# Patient Record
Sex: FEMALE | Race: WHITE | NOT HISPANIC OR LATINO | Employment: OTHER | URBAN - METROPOLITAN AREA
[De-identification: names, ages, dates, MRNs, and addresses within clinical notes are randomized per-mention and may not be internally consistent; named-entity substitution may affect disease eponyms.]

---

## 2017-04-27 ENCOUNTER — TRANSCRIBE ORDERS (OUTPATIENT)
Dept: ADMINISTRATIVE | Facility: HOSPITAL | Age: 72
End: 2017-04-27

## 2017-04-27 DIAGNOSIS — R07.9 CHEST PAIN, UNSPECIFIED: Primary | ICD-10-CM

## 2017-05-01 ENCOUNTER — TRANSCRIBE ORDERS (OUTPATIENT)
Dept: ADMINISTRATIVE | Facility: HOSPITAL | Age: 72
End: 2017-05-01

## 2017-05-01 DIAGNOSIS — R07.9 CHEST PAIN, UNSPECIFIED: Primary | ICD-10-CM

## 2017-05-15 ENCOUNTER — TRANSCRIBE ORDERS (OUTPATIENT)
Dept: RADIOLOGY | Facility: HOSPITAL | Age: 72
End: 2017-05-15

## 2017-05-15 ENCOUNTER — HOSPITAL ENCOUNTER (OUTPATIENT)
Dept: RADIOLOGY | Facility: HOSPITAL | Age: 72
Discharge: HOME | End: 2017-05-15
Payer: COMMERCIAL

## 2017-05-15 DIAGNOSIS — M25.511 RIGHT SHOULDER PAIN, UNSPECIFIED CHRONICITY: Primary | ICD-10-CM

## 2017-05-15 DIAGNOSIS — M25.511 RIGHT SHOULDER PAIN, UNSPECIFIED CHRONICITY: ICD-10-CM

## 2017-05-15 PROCEDURE — 73030 X-RAY EXAM OF SHOULDER: CPT

## 2017-05-17 ENCOUNTER — HOSPITAL ENCOUNTER (OUTPATIENT)
Dept: NON INVASIVE DIAGNOSTICS | Facility: HOSPITAL | Age: 72
Discharge: HOME | End: 2017-05-17
Payer: COMMERCIAL

## 2017-05-17 DIAGNOSIS — R07.9 CHEST PAIN, UNSPECIFIED: ICD-10-CM

## 2017-05-17 LAB
ARRHY DURING EX: NORMAL
CHEST PAIN STATEMENT: NORMAL
MAX DIASTOLIC BP: 90 MMHG
MAX HEART RATE: 141 BPM
MAX PREDICTED HEART RATE: 149 BPM
MAX. SYSTOLIC BP: 160 MMHG
PROTOCOL NAME: NORMAL
REASON FOR TERMINATION: NORMAL
TARGET HR FORMULA: NORMAL
TEST INDICATION: NORMAL
TIME IN EXERCISE PHASE: 540 S

## 2017-05-17 PROCEDURE — 93350 STRESS TTE ONLY: CPT

## 2017-10-16 ENCOUNTER — GENERIC CONVERSION - ENCOUNTER (OUTPATIENT)
Dept: OTHER | Facility: OTHER | Age: 72
End: 2017-10-16

## 2017-10-16 DIAGNOSIS — Z12.31 ENCOUNTER FOR SCREENING MAMMOGRAM FOR MALIGNANT NEOPLASM OF BREAST: ICD-10-CM

## 2017-11-28 ENCOUNTER — HOSPITAL ENCOUNTER (OUTPATIENT)
Dept: RADIOLOGY | Facility: HOSPITAL | Age: 72
Discharge: HOME | End: 2017-11-28
Attending: OBSTETRICS & GYNECOLOGY
Payer: COMMERCIAL

## 2017-11-28 DIAGNOSIS — Z12.31 ENCOUNTER FOR SCREENING MAMMOGRAM FOR MALIGNANT NEOPLASM OF BREAST: ICD-10-CM

## 2017-11-28 PROCEDURE — G0202 SCR MAMMO BI INCL CAD: HCPCS

## 2017-12-05 ENCOUNTER — GENERIC CONVERSION - ENCOUNTER (OUTPATIENT)
Dept: OTHER | Facility: OTHER | Age: 72
End: 2017-12-05

## 2017-12-05 DIAGNOSIS — N95.1 FEMALE CLIMACTERIC STATE: ICD-10-CM

## 2017-12-05 PROCEDURE — G0145 SCR C/V CYTO,THINLAYER,RESCR: HCPCS | Performed by: OBSTETRICS & GYNECOLOGY

## 2017-12-06 ENCOUNTER — LAB REQUISITION (OUTPATIENT)
Dept: LAB | Facility: HOSPITAL | Age: 72
End: 2017-12-06
Payer: COMMERCIAL

## 2017-12-06 DIAGNOSIS — Z01.419 ENCOUNTER FOR GYNECOLOGICAL EXAMINATION WITHOUT ABNORMAL FINDING: ICD-10-CM

## 2017-12-15 LAB
LAB AP GYN PRIMARY INTERPRETATION: NORMAL
Lab: NORMAL

## 2017-12-18 ENCOUNTER — GENERIC CONVERSION - ENCOUNTER (OUTPATIENT)
Dept: OTHER | Facility: OTHER | Age: 72
End: 2017-12-18

## 2018-01-13 NOTE — RESULT NOTES
Verified Results  * MAMMO SCREENING BILATERAL W CAD [id number] 08:26AM Esmer Armendariz     Test Name Result Flag Reference   MAMMO SCREENING BILATERAL W CAD (Report)     Patient History:   Patient is postmenopausal    Family history of breast cancer in paternal aunt at age 61,    colorectal cancer in maternal grandfather at age 48 or over, and    breast cancer in maternal aunt at age 79  Benign WBUS breast guidance of the left breast, March 24, 2010  Excisional biopsy of the left breast    Took hormonal contraceptives for 3 months  Patient has never smoked  Patient's BMI is 23 2  Reason for exam: screening (asymptomatic)  Mammo Screening Bilateral W CAD: August 31, 2016 - Check In #:    [de-identified]   Bilateral MLO and CC view(s) were taken  Technologist: RT Rupesh(R)(M)   Prior study comparison: August 6, 2015, bilateral digital    screening mammogram performed at [address] [place] [address]     July 25, 2014, digital bilateral screening mammogram, performed    at [address] [place] [address]  July 11, 2013, digital bilateral   screening mammogram, performed at [address] [place] [address]  There are scattered fibroglandular densities  No new dominant    soft tissue mass, architectural distortion or suspicious    calcifications are noted  The skin and nipple structures are    within normal limits  Stable intramammary lymphnodes noted  Surgical scar marker present  No mammographic evidence of malignancy  No    significant changes when compared with prior studies  ASSESSMENT: BiRad:2 - Benign     Recommendation:   Routine screening mammogram of both breasts in 1 year  A    reminder letter will be scheduled  Analyzed by CAD     8-10% of cancers will be missed on mammography  Management of a    palpable abnormality must be based on clinical grounds  Patients   will be notified of their results via letter from our facility     Accredited by [employer] and FDA      Transcription Location: [facility]: [id number]     Risk Value(s):   Tyrer-Cuzick 10 Year: 11 135%, Tyrer-Cuzick Lifetime: 17 178%,    Myriad Table: 1 5%, NEIL 5 Year: 3 1%, NCI Lifetime: 8 9%   Signed by:   Aracely Moss MD   8/31/16

## 2018-01-15 NOTE — MISCELLANEOUS
Message   Recorded as Task   Date: 10/16/2017 11:00 AM, Created By: Sadaf Petersen   Task Name: Care Coordination   Assigned To: Be Renee   Regarding Patient: Joshua Melgar, Status: Active   CommentDayanaord Severin - 16 Oct 2017 11:00 AM     TASK CREATED  Caller: Self; Care Coordination; [phone] (Home)  Pt request mammo order  pt sees RS 11/21   Francisco Javier iMlls - 16 Oct 2017 11:19 AM     TASK EDITED  order in allscripts        Active Problems    1  Encounter for screening mammogram for malignant neoplasm of breast (V76 12)   (Z12 31)   2  Pap smear, as part of routine gynecological examination (V76 2) (Z01 419)   3  Visit for screening mammogram (V76 12) (Z12 31)    Current Meds   1  Calcium + D TABS; Therapy: (Delle Sox) to Recorded   2  Claritin CAPS; Therapy: (Recorded:[id number]) to Recorded    Allergies    1  No Known Drug Allergies    Plan  Encounter for screening mammogram for malignant neoplasm of breast    · * MAMMO SCREENING BILATERAL W CAD; Status:Hold For - Scheduling,Retrospective  By Protocol Authorization;  Requested PRL:[id number];     Signatures   Electronically signed by : Jeremy Aponte, ; Oct 16 2017 11:19AM EST                       (Author)

## 2018-01-17 NOTE — RESULT NOTES
Verified Results  * MAMMO SCREENING BILATERAL W CAD [id number] 03:11PM Opal Ho Order Number: [id number]    - Patient Instructions: To schedule this appointment, please contact Central Scheduling at [phone]  Do not wear any perfume, powder, lotion or deodorant on breast or underarm area  Please bring your doctors order, referral (if needed) and insurance information with you on the day of the test  Failure to bring this information may result in this test being rescheduled  Arrive 15 minutes prior to your appointment time to register  On the day of your test, please bring any prior mammogram or breast studies with you that were not performed at a [facility]  Failure to bring prior exams may result in your test needing to be rescheduled  Test Name Result Flag Reference   MAMMO SCREENING BILATERAL W CAD (Report)     Patient History:   Patient is postmenopausal    Family history of breast cancer at age 79 in maternal aunt,    breast cancer at age 61 in paternal aunt, colorectal cancer at    age 48 or over in maternal grandfather  Benign WBUS breast guidance of the left breast, March 24, 2010  Excisional biopsy of the left breast    Took hormonal contraceptives for 3 months  Patient has never smoked  Patient's BMI is 23 2  Reason for exam: screening, asymptomatic  Mammo Screening Bilateral W CAD: November 28, 2017 - Check In #:    [de-identified]   Bilateral CC and MLO view(s) were taken  Technologist: Danford Severin, RT(R)(M)   Prior study comparison: August 31, 2016, mammo screening    bilateral W CAD performed at [address] [place] [address]     August 6, 2015, bilateral digital screening mammogram performed    at [address] [place] [address]      The breast tissue is heterogeneously dense, potentially limiting    the sensitivity of mammography   Patient risk, included in this    report, assists in determining the appropriate screening regimen    (such as 3-D mammography or the inclusion of automated breast    ultrasound or MRI)  3-D mammography may also remain indicated as    screening  No dominant soft tissue mass, architectural distortion or    suspicious calcifications are noted in either breast   The skin    and nipple contours are within normal limits  No evidence of malignancy  No significant changes when compared with prior studies  ACR BI-RADSï¾® Assessments: BiRad:1 - Negative     Recommendation:   Routine screening mammogram of both breasts in 1 year  Analyzed by CAD     The patient is scheduled in a reminder system for screening    mammography  8-10% of cancers will be missed on mammography  Management of a    palpable abnormality must be based on clinical grounds  Patients   will be notified of their results via letter from our facility  Accredited by [employer] and FDA       Transcription Location: [facility] 98: [id number]     Risk Value(s):   Tyrer-Cuzick 10 Year: 5 700%, Tyrer-Cuzick Lifetime: 7 600%,    Myriad Table: 1 5%, NEIL 5 Year: 3 1%, NCI Lifetime: 8 1%   Signed by:   Ender Lainez MD   11/28/17

## 2018-01-23 NOTE — RESULT NOTES
Verified Results  (1) THIN PREP PAP WITH IMAGING [id number] 03:10PM [facility]     Test Name Result Flag Reference   LAB AP CASE REPORT (Report)     Gynecologic Cytology Report            Case: [id number]                  Authorizing Provider: Macey Canales MD     Collected:      12/05/2017           First Screen:     Tawnya Harada, CT   Received:      12/07/2017 1861        Rescreen:       WYATT Day                              Specimen:  LIQUID-BASED PAP, SCREENING, Cervix   LAB AP GYN PRIMARY INTERPRETATION      Negative for intraepithelial lesion or malignancy  Electronically signed by WYATT Day on 12/15/2017 at 3:10 PM   LAB AP GYN SPECIMEN ADEQUACY      Satisfactory for evaluation  (See note)   LAB AP GYN NOTE      No endocervical cells identified  It is difficult to differentiate between   squamous metaplastic cells and parabasal cells in atrophic specimens due   to numerous causes including menopause, postpartum changes and   progestational agents  LAB AP GYN ADDITIONAL INFORMATION (Report)     [employer]'s FDA approved ,  and ThinPrep Imaging System are   utilized with strict adherence to the 's instruction manual to   prepare gynecologic and non-gynecologic cytology specimens for the   production of ThinPrep slides as well as for gynecologic ThinPrep imaging  These processes have been validated by our laboratory and/or by the     The Pap test is not a diagnostic procedure and should not be used as the   sole means to detect cervical cancer  It is only a screening procedure to   aid in the detection of cervical cancer and its precursors  Both   false-negative and false-positive results have been experienced  Your   patient's test result should be interpreted in this context together with   the history and clinical findings     LAB AP LMP

## 2018-01-24 VITALS
SYSTOLIC BLOOD PRESSURE: 126 MMHG | HEIGHT: 62 IN | BODY MASS INDEX: 24.66 KG/M2 | WEIGHT: 134 LBS | DIASTOLIC BLOOD PRESSURE: 66 MMHG

## 2018-05-01 ENCOUNTER — TRANSCRIBE ORDERS (OUTPATIENT)
Dept: RADIOLOGY | Facility: CLINIC | Age: 73
End: 2018-05-01

## 2018-05-03 ENCOUNTER — HOSPITAL ENCOUNTER (OUTPATIENT)
Dept: RADIOLOGY | Age: 73
Discharge: HOME | End: 2018-05-03
Payer: COMMERCIAL

## 2018-05-03 DIAGNOSIS — N95.1 FEMALE CLIMACTERIC STATE: ICD-10-CM

## 2018-05-03 PROCEDURE — 77080 DXA BONE DENSITY AXIAL: CPT

## 2018-05-04 ENCOUNTER — TELEPHONE (OUTPATIENT)
Dept: OBGYN CLINIC | Facility: CLINIC | Age: 73
End: 2018-05-04

## 2018-05-04 NOTE — TELEPHONE ENCOUNTER
----- Message from Yue Sterling MD sent at 5/4/2018  9:23 AM EDT -----  Please call the patient regarding her DEXA scan  Her DEXA scan shows osteopenia  This is best treated with 1200 international units of vitamin D a day and 1200 mg of calcium a day plus exercise

## 2018-08-10 ENCOUNTER — APPOINTMENT (OUTPATIENT)
Dept: RADIOLOGY | Age: 73
End: 2018-08-10
Payer: COMMERCIAL

## 2018-08-10 ENCOUNTER — TRANSCRIBE ORDERS (OUTPATIENT)
Dept: ADMINISTRATIVE | Age: 73
End: 2018-08-10

## 2018-08-10 DIAGNOSIS — R05.9 COUGH: ICD-10-CM

## 2018-08-10 DIAGNOSIS — R05.9 COUGH: Primary | ICD-10-CM

## 2018-08-10 PROCEDURE — 71046 X-RAY EXAM CHEST 2 VIEWS: CPT

## 2018-08-13 ENCOUNTER — HOSPITAL ENCOUNTER (INPATIENT)
Facility: HOSPITAL | Age: 73
LOS: 2 days | Discharge: HOME | DRG: 871 | End: 2018-08-17
Attending: INTERNAL MEDICINE | Admitting: INTERNAL MEDICINE
Payer: COMMERCIAL

## 2018-08-13 ENCOUNTER — APPOINTMENT (EMERGENCY)
Dept: RADIOLOGY | Facility: HOSPITAL | Age: 73
DRG: 871 | End: 2018-08-13
Payer: COMMERCIAL

## 2018-08-13 DIAGNOSIS — J18.9 RIGHT LOWER LOBE PNEUMONIA: Primary | ICD-10-CM

## 2018-08-13 DIAGNOSIS — J18.9 COMMUNITY ACQUIRED PNEUMONIA, UNSPECIFIED LATERALITY: ICD-10-CM

## 2018-08-13 DIAGNOSIS — R06.82 TACHYPNEA: ICD-10-CM

## 2018-08-13 DIAGNOSIS — R09.02 HYPOXIA: ICD-10-CM

## 2018-08-13 DIAGNOSIS — D72.829 LEUKOCYTOSIS: ICD-10-CM

## 2018-08-13 PROBLEM — R74.01 TRANSAMINITIS: Status: ACTIVE | Noted: 2018-08-13

## 2018-08-13 PROBLEM — Z91.09 ENVIRONMENTAL ALLERGIES: Chronic | Status: ACTIVE | Noted: 2018-08-13

## 2018-08-13 PROBLEM — E87.20 LACTIC ACID ACIDOSIS: Status: ACTIVE | Noted: 2018-08-13

## 2018-08-13 PROBLEM — R73.9 HYPERGLYCEMIA: Status: ACTIVE | Noted: 2018-08-13

## 2018-08-13 PROBLEM — R06.02 SOB (SHORTNESS OF BREATH): Status: ACTIVE | Noted: 2018-08-13

## 2018-08-13 PROBLEM — J45.909 MILD ASTHMA: Chronic | Status: ACTIVE | Noted: 2018-08-13

## 2018-08-13 PROBLEM — R74.8 ALKALINE PHOSPHATASE ELEVATION: Status: ACTIVE | Noted: 2018-08-13

## 2018-08-13 PROBLEM — E87.2 LACTIC ACID ACIDOSIS: Status: ACTIVE | Noted: 2018-08-13

## 2018-08-13 LAB
ALBUMIN SERPL BCP-MCNC: 2.6 G/DL (ref 3.5–5)
ALP SERPL-CCNC: 199 U/L (ref 46–116)
ALT SERPL W P-5'-P-CCNC: 127 U/L (ref 12–78)
ANION GAP SERPL CALCULATED.3IONS-SCNC: 8 MMOL/L (ref 4–13)
AST SERPL W P-5'-P-CCNC: 92 U/L (ref 5–45)
ATRIAL RATE: 91 BPM
BASOPHILS # BLD MANUAL: 0 THOUSAND/UL (ref 0–0.1)
BASOPHILS NFR MAR MANUAL: 0 % (ref 0–1)
BILIRUB SERPL-MCNC: 0.51 MG/DL (ref 0.2–1)
BNP SERPL-MCNC: 80 PG/ML (ref 1–100)
BUN SERPL-MCNC: 16 MG/DL (ref 5–25)
CALCIUM SERPL-MCNC: 9.9 MG/DL (ref 8.3–10.1)
CHLORIDE SERPL-SCNC: 100 MMOL/L (ref 100–108)
CO2 SERPL-SCNC: 27 MMOL/L (ref 21–32)
CREAT SERPL-MCNC: 0.94 MG/DL (ref 0.6–1.3)
EOSINOPHIL # BLD MANUAL: 0 THOUSAND/UL (ref 0–0.4)
EOSINOPHIL NFR BLD MANUAL: 0 % (ref 0–6)
ERYTHROCYTE [DISTWIDTH] IN BLOOD BY AUTOMATED COUNT: 13.4 % (ref 11.6–15.1)
EST. AVERAGE GLUCOSE BLD GHB EST-MCNC: 140 MG/DL
GFR SERPL CREATININE-BSD FRML MDRD: 61 ML/MIN/1.73SQ M
GGT SERPL-CCNC: 191 U/L (ref 5–85)
GIANT PLATELETS BLD QL SMEAR: PRESENT
GLUCOSE SERPL-MCNC: 239 MG/DL (ref 65–140)
GLUCOSE SERPL-MCNC: 322 MG/DL (ref 65–140)
GLUCOSE SERPL-MCNC: 331 MG/DL (ref 65–140)
HBA1C MFR BLD: 6.5 % (ref 4.2–6.3)
HCT VFR BLD AUTO: 36 % (ref 34.8–46.1)
HGB BLD-MCNC: 11.7 G/DL (ref 11.5–15.4)
L PNEUMO1 AG UR QL IA.RAPID: NEGATIVE
LACTATE SERPL-SCNC: 2.2 MMOL/L (ref 0.5–2)
LACTATE SERPL-SCNC: 2.9 MMOL/L (ref 0.5–2)
LYMPHOCYTES # BLD AUTO: 0.23 THOUSAND/UL (ref 0.6–4.47)
LYMPHOCYTES # BLD AUTO: 1 % (ref 14–44)
MCH RBC QN AUTO: 30.2 PG (ref 26.8–34.3)
MCHC RBC AUTO-ENTMCNC: 32.5 G/DL (ref 31.4–37.4)
MCV RBC AUTO: 93 FL (ref 82–98)
METAMYELOCYTES NFR BLD MANUAL: 1 % (ref 0–1)
MONOCYTES # BLD AUTO: 0.93 THOUSAND/UL (ref 0–1.22)
MONOCYTES NFR BLD: 4 % (ref 4–12)
NEUTROPHILS # BLD MANUAL: 21.86 THOUSAND/UL (ref 1.85–7.62)
NEUTS BAND NFR BLD MANUAL: 11 % (ref 0–8)
NEUTS SEG NFR BLD AUTO: 83 % (ref 43–75)
NRBC BLD AUTO-RTO: 0 /100 WBCS
P AXIS: 73 DEGREES
PLATELET # BLD AUTO: 309 THOUSANDS/UL (ref 149–390)
PLATELET BLD QL SMEAR: ADEQUATE
PMV BLD AUTO: 11.5 FL (ref 8.9–12.7)
POTASSIUM SERPL-SCNC: 3.9 MMOL/L (ref 3.5–5.3)
PR INTERVAL: 126 MS
PROCALCITONIN SERPL-MCNC: 0.29 NG/ML
PROT SERPL-MCNC: 7.9 G/DL (ref 6.4–8.2)
QRS AXIS: -17 DEGREES
QRSD INTERVAL: 102 MS
QT INTERVAL: 342 MS
QTC INTERVAL: 420 MS
RBC # BLD AUTO: 3.87 MILLION/UL (ref 3.81–5.12)
S PNEUM AG UR QL: NEGATIVE
SODIUM SERPL-SCNC: 135 MMOL/L (ref 136–145)
T WAVE AXIS: 20 DEGREES
TROPONIN I SERPL-MCNC: <0.02 NG/ML
VENTRICULAR RATE: 91 BPM
WBC # BLD AUTO: 23.25 THOUSAND/UL (ref 4.31–10.16)

## 2018-08-13 PROCEDURE — 82948 REAGENT STRIP/BLOOD GLUCOSE: CPT

## 2018-08-13 PROCEDURE — 93005 ELECTROCARDIOGRAM TRACING: CPT

## 2018-08-13 PROCEDURE — 99285 EMERGENCY DEPT VISIT HI MDM: CPT

## 2018-08-13 PROCEDURE — 84484 ASSAY OF TROPONIN QUANT: CPT | Performed by: INTERNAL MEDICINE

## 2018-08-13 PROCEDURE — 99220 PR INITIAL OBSERVATION CARE/DAY 70 MINUTES: CPT | Performed by: INTERNAL MEDICINE

## 2018-08-13 PROCEDURE — 83880 ASSAY OF NATRIURETIC PEPTIDE: CPT | Performed by: EMERGENCY MEDICINE

## 2018-08-13 PROCEDURE — 83036 HEMOGLOBIN GLYCOSYLATED A1C: CPT | Performed by: INTERNAL MEDICINE

## 2018-08-13 PROCEDURE — 87449 NOS EACH ORGANISM AG IA: CPT | Performed by: EMERGENCY MEDICINE

## 2018-08-13 PROCEDURE — 93010 ELECTROCARDIOGRAM REPORT: CPT | Performed by: INTERNAL MEDICINE

## 2018-08-13 PROCEDURE — 36415 COLL VENOUS BLD VENIPUNCTURE: CPT | Performed by: EMERGENCY MEDICINE

## 2018-08-13 PROCEDURE — 87040 BLOOD CULTURE FOR BACTERIA: CPT | Performed by: EMERGENCY MEDICINE

## 2018-08-13 PROCEDURE — 85007 BL SMEAR W/DIFF WBC COUNT: CPT | Performed by: INTERNAL MEDICINE

## 2018-08-13 PROCEDURE — 82977 ASSAY OF GGT: CPT | Performed by: INTERNAL MEDICINE

## 2018-08-13 PROCEDURE — 80053 COMPREHEN METABOLIC PANEL: CPT | Performed by: INTERNAL MEDICINE

## 2018-08-13 PROCEDURE — 71046 X-RAY EXAM CHEST 2 VIEWS: CPT

## 2018-08-13 PROCEDURE — 83605 ASSAY OF LACTIC ACID: CPT | Performed by: INTERNAL MEDICINE

## 2018-08-13 PROCEDURE — 83605 ASSAY OF LACTIC ACID: CPT | Performed by: EMERGENCY MEDICINE

## 2018-08-13 PROCEDURE — 87070 CULTURE OTHR SPECIMN AEROBIC: CPT | Performed by: EMERGENCY MEDICINE

## 2018-08-13 PROCEDURE — 94640 AIRWAY INHALATION TREATMENT: CPT

## 2018-08-13 PROCEDURE — 85027 COMPLETE CBC AUTOMATED: CPT | Performed by: INTERNAL MEDICINE

## 2018-08-13 PROCEDURE — 84145 PROCALCITONIN (PCT): CPT | Performed by: EMERGENCY MEDICINE

## 2018-08-13 PROCEDURE — 87205 SMEAR GRAM STAIN: CPT | Performed by: EMERGENCY MEDICINE

## 2018-08-13 RX ORDER — ALBUTEROL SULFATE 2.5 MG/3ML
2.5 SOLUTION RESPIRATORY (INHALATION) EVERY 4 HOURS PRN
Status: DISCONTINUED | OUTPATIENT
Start: 2018-08-13 | End: 2018-08-14

## 2018-08-13 RX ORDER — SODIUM CHLORIDE 9 MG/ML
125 INJECTION, SOLUTION INTRAVENOUS CONTINUOUS
Status: DISCONTINUED | OUTPATIENT
Start: 2018-08-13 | End: 2018-08-14

## 2018-08-13 RX ORDER — ACETAMINOPHEN 325 MG/1
650 TABLET ORAL EVERY 6 HOURS PRN
Status: DISCONTINUED | OUTPATIENT
Start: 2018-08-13 | End: 2018-08-17 | Stop reason: HOSPADM

## 2018-08-13 RX ADMIN — IPRATROPIUM BROMIDE 0.5 MG: 0.5 SOLUTION RESPIRATORY (INHALATION) at 17:08

## 2018-08-13 RX ADMIN — INSULIN LISPRO 3 UNITS: 100 INJECTION, SOLUTION INTRAVENOUS; SUBCUTANEOUS at 23:06

## 2018-08-13 RX ADMIN — SODIUM CHLORIDE 125 ML/HR: 0.9 INJECTION, SOLUTION INTRAVENOUS at 20:12

## 2018-08-13 RX ADMIN — SODIUM CHLORIDE 500 ML: 0.9 INJECTION, SOLUTION INTRAVENOUS at 22:50

## 2018-08-13 RX ADMIN — SODIUM CHLORIDE 1000 ML: 0.9 INJECTION, SOLUTION INTRAVENOUS at 17:05

## 2018-08-13 RX ADMIN — ENOXAPARIN SODIUM 40 MG: 40 INJECTION SUBCUTANEOUS at 20:26

## 2018-08-13 RX ADMIN — ALBUTEROL SULFATE 5 MG: 2.5 SOLUTION RESPIRATORY (INHALATION) at 17:08

## 2018-08-13 RX ADMIN — CEFTRIAXONE SODIUM 1000 MG: 10 INJECTION, POWDER, FOR SOLUTION INTRAVENOUS at 17:05

## 2018-08-13 NOTE — ED ATTENDING ATTESTATION
I, Hilda Donald DO, saw and evaluated the patient  I have discussed the patient with the resident/non-physician practitioner and agree with the resident's/non-physician practitioner's findings, Plan of Care, and MDM as documented in the resident's/non-physician practitioner's note, except where noted  All available labs and Radiology studies were reviewed  At this point I agree with the current assessment done in the Emergency Department  I have conducted an independent evaluation of this patient a history and physical is as follows:      Critical Care Time  CritCare Time    Procedures     67 yr old fem to the ED with incr and fatigue and sob while being tx'd for bronchitis / pneumonia  Started on Zpak last Tues for bronchitis  CXR on Friday with possible RLL pneumonia  Started on pred on Friday  Finished Zpak on Saturday  Continued with incr worsening so sent to the ED  Exm; ox as low as 88% on RA  Tachypneic with rales in right base  Faint whz with cough  Afebrile  Appears exhausted  Better on the O2  EKG: new ST inversion in III  Pln: adm for outpt failure tx pneumonia with hypoxia and EKG changes

## 2018-08-13 NOTE — SEPSIS NOTE
Sepsis Note   Sandeep Fu 67 y o  female MRN: [id number]  Unit/Bed#: ED 29 Encounter: [id number]            Initial Sepsis Screening     Row Name 08/13/18 3890                Is the patient's history suggestive of a new or worsening infection? (!)  Yes (Proceed)  -HB        Suspected source of infection pneumonia  -HB        Are two or more of the following signs & symptoms of infection both present and new to the patient? (!)  Yes (Proceed)  -HB        Indicate SIRS criteria Leukocytosis (WBC > 79974 IJL); Tachycardia > 90 bpm;Tachypnea > 20 resp per min  -HB        If the answer is yes to both questions, suspicion of sepsis is present          If severe sepsis is present AND tissue hypoperfusion perists in the hour after fluid resuscitation or lactate > 4, the patient meets criteria for SEPTIC SHOCK          Are any of the following organ dysfunction criteria present within 6 hours of suspected infection and SIRS criteria that are NOT considered to be chronic conditions? (!)  Yes  -HB        Organ dysfunction          Date of presentation of severe sepsis          Time of presentation of severe sepsis          Tissue hypoperfusion persists in the hour after crystalloid fluid administration, evidenced, by either:          Was hypotension present within one hour of the conclusion of crystalloid fluid administration?           Date of presentation of septic shock          Time of presentation of septic shock            User Key  (r) = Recorded By, (t) = Taken By, (c) = Cosigned By    Initials Name Provider Type    DO Roberta Moncada

## 2018-08-13 NOTE — SEPSIS NOTE
Sepsis Note   Juliana Fonseca 67 y o  female MRN: [id number]  Unit/Bed#: ED 29 Encounter: [id number]            Initial Sepsis Screening     Row Name 08/13/18 1644                Is the patient's history suggestive of a new or worsening infection? (!)  Yes (Proceed)  -HB        Suspected source of infection pneumonia  -HB        Are two or more of the following signs & symptoms of infection both present and new to the patient? (!)  Yes (Proceed)  -HB        Indicate SIRS criteria Leukocytosis (WBC > 28780 IJL); Tachycardia > 90 bpm;Tachypnea > 20 resp per min  -HB        If the answer is yes to both questions, suspicion of sepsis is present          If severe sepsis is present AND tissue hypoperfusion perists in the hour after fluid resuscitation or lactate > 4, the patient meets criteria for SEPTIC SHOCK          Are any of the following organ dysfunction criteria present within 6 hours of suspected infection and SIRS criteria that are NOT considered to be chronic conditions? (!)  Yes  -HB        Organ dysfunction          Date of presentation of severe sepsis          Time of presentation of severe sepsis          Tissue hypoperfusion persists in the hour after crystalloid fluid administration, evidenced, by either:          Was hypotension present within one hour of the conclusion of crystalloid fluid administration?           Date of presentation of septic shock          Time of presentation of septic shock            User Key  (r) = Recorded By, (t) = Taken By, (c) = Cosigned By    Initials Name Provider Type    CHELSEA Moore DO Resident               Default Flowsheet Data (last 720 hours)      Sepsis Reassess     Row Name 08/13/18 9826                   Repeat Volume Status and Tissue Perfusion Assessment Performed    Repeat Volume Status and Tissue Perfusion Assessment Performed Yes  -HB           Volume Status and Tissue Perfusion Post Fluid Resuscitation- Must Document 5 of the Following 7: Vital Signs Reviewed (HR, RR, BP, T) Yes  -HB        Arterial Oxygen Saturation Reviewed (POx, SaO2 or SpO2) Yes (comment %)   92%  -HB        Cardio Normal S1/S2; Regular rate and rhythm; No murmor; No rub or gallop  -HB        Pulmonary (!)  Rales; Wheezes; Tachypnea  -HB        Capillary Refill Brisk  -HB        Peripheral Pulses Radial  -HB        Peripheral Pulse +2  -HB        Skin Warm;Dry  -HB        Urine output assessed Adequate  -HB           *OR*   Intensive Monitoring- Must Document One of the Following Four *:    Vital Signs Reviewed Yes  -HB        * Central Venous Pressure (CVP or RAP)          * Central Venous Oxygen (SVO2, ScvO2 or Oxygen saturation via central catheter)          * Bedside Cardiovascular US in IVC diameter and % collapse          * Passive Leg Raise OR Crystalloid Challenge            User Key  (r) = Recorded By, (t) = Taken By, (c) = Cosigned By    Initials Name Provider Type    CHELSEA Mascorro DO Resident

## 2018-08-13 NOTE — ED PROVIDER NOTES
History  Chief Complaint   Patient presents with    Shortness of Breath     Pt states "my allergist sent me in here because I have bronchitis and pneumonia " Pt c/o SOB with a cough with "thick yellow phlegm" and being "totally exhausted "     Patient is a 70-year-old female presenting to the emergency department for worsening of dyspnea, productive cough with yellow phlegm, and pleuritic chest pain  The patient states that last Tuesday she was diagnosed with acute bronchitis, was prescribed azithromycin, which she has completed her course of azithromycin this last Saturday  She returned to her physician, due to worsening symptoms on Saturday, chest x-ray revealed right lower lobe infiltrate  She was also prescribed oral prednisone  She was told to continue her antibiotics and steroids, and present to the emergency room should her symptoms worsen  She states she is here because she continues to have worsening of her dyspnea, her symptoms of dyspnea, cough, pleuritic chest pain have not resolved  She complains of subjective fever and chills, no recent hospitalizations, she lives at home  She has past medical history of asthma, and a p r n  albuterol inhaler which she has been utilizing her 4 hr without relief of her symptoms of dyspnea; She has never had to be hospitalized for her asthma, she has never had to be intubated for her asthma  The patient denies associated nausea, vomiting, abdominal pain  She does endorse diarrhea, without hematochezia or melena, which she states began shortly after she started her course of azithromycin  No prior history cardiac stenting or coronary artery disease, no history of DVT or pulmonary embolism  She states her chest pain is only associated with coughing, is right-sided and sharp in nature when she coughs  She denies hemoptysis  No history of formal therapy replacement    She denies back pain, neck pain or stiffness, dizziness, lightheadedness, does endorse fatigue which she states started around the same time as her cough and dyspnea  She had a sore throat when her symptoms began over a week ago, currently resolved  History provided by:  Patient   used: No    Shortness of Breath   Severity:  Unable to specify  Timing:  Constant  Progression:  Worsening  Chronicity:  New  Context: activity    Relieved by:  Nothing  Worsened by:  Nothing  Ineffective treatments:  None tried  Associated symptoms: chest pain, cough, diaphoresis, fever, sore throat and wheezing    Associated symptoms: no abdominal pain, no claudication, no ear pain, no headaches, no hemoptysis, no neck pain, no PND, no rash, no sputum production, no syncope, no swollen glands and no vomiting        None       Past Medical History:   Diagnosis Date    Allergic     environmental    Asthma     Pneumonia        Past Surgical History:   Procedure Laterality Date    BACK SURGERY      BREAST SURGERY      right biopsy    FOOT SURGERY      TONSILLECTOMY         Family History   Problem Relation Age of Onset    No Known Problems Mother     No Known Problems Father      I have reviewed and agree with the history as documented  Social History   Substance Use Topics    Smoking status: Never Smoker    Smokeless tobacco: Never Used    Alcohol use Yes      Comment: rarely        Review of Systems   Constitutional: Positive for activity change, diaphoresis, fatigue and fever  Negative for appetite change, chills and unexpected weight change  HENT: Positive for rhinorrhea, sinus pressure and sore throat  Negative for congestion, dental problem, drooling, ear discharge, ear pain, facial swelling, hearing loss, mouth sores, nosebleeds, postnasal drip, sinus pain, sneezing, tinnitus, trouble swallowing and voice change  Eyes: Negative  Respiratory: Positive for cough, shortness of breath and wheezing   Negative for apnea, hemoptysis, sputum production, choking, chest tightness and stridor  Cardiovascular: Positive for chest pain  Negative for palpitations, claudication, leg swelling, syncope and PND  Gastrointestinal: Positive for diarrhea  Negative for abdominal distention, abdominal pain, anal bleeding, blood in stool, constipation, nausea, rectal pain and vomiting  Endocrine: Negative  Genitourinary: Negative  Negative for decreased urine volume, difficulty urinating, dyspareunia, dysuria, enuresis, flank pain, frequency, genital sores, hematuria, menstrual problem, pelvic pain, urgency, vaginal bleeding, vaginal discharge and vaginal pain  Musculoskeletal: Negative  Negative for arthralgias, back pain, gait problem, joint swelling, myalgias, neck pain and neck stiffness  Skin: Negative  Negative for color change, pallor, rash and wound  Allergic/Immunologic: Negative  Neurological: Negative  Negative for dizziness, tremors, seizures, syncope, facial asymmetry, speech difficulty, weakness, light-headedness, numbness and headaches  Hematological: Negative  Psychiatric/Behavioral: Negative  Physical Exam  ED Triage Vitals [08/13/18 1452]   Temperature Pulse Respirations Blood Pressure SpO2   98 8 °F (37 1 °C) 105 (!) 24 113/63 91 %      Temp Source Heart Rate Source Patient Position - Orthostatic VS BP Location FiO2 (%)   Oral Monitor Sitting Left arm --      Pain Score       No Pain           Orthostatic Vital Signs  Vitals:    08/13/18 1452 08/13/18 1652   BP: 113/63 108/56   Pulse: 105 86   Patient Position - Orthostatic VS: Sitting Sitting       Physical Exam   Constitutional: She is oriented to person, place, and time  She appears well-developed and well-nourished  No distress  She is not intubated  HENT:   Head: Normocephalic and atraumatic  Nose: Nose normal    Mouth/Throat: Oropharynx is clear and moist  No oropharyngeal exudate  Eyes: Conjunctivae and EOM are normal  Pupils are equal, round, and reactive to light   Right eye exhibits no discharge  Left eye exhibits no discharge  No scleral icterus  Neck: Normal range of motion  Neck supple  No JVD present  No tracheal deviation present  Cardiovascular: Regular rhythm, normal heart sounds and intact distal pulses  Exam reveals no gallop and no friction rub  No murmur heard  Tachycardic  Pulmonary/Chest: No accessory muscle usage or stridor  Tachypnea noted  No bradypnea  She is not intubated  No respiratory distress  She has no decreased breath sounds  She has wheezes in the right upper field and the right middle field  She has no rhonchi  She has rales in the right lower field  She exhibits no tenderness  Abdominal: Soft  Bowel sounds are normal  She exhibits no distension and no mass  There is no tenderness  There is no rebound and no guarding  No hernia  Musculoskeletal: Normal range of motion  She exhibits no edema, tenderness or deformity  Neurological: She is alert and oriented to person, place, and time  No cranial nerve deficit or sensory deficit  She exhibits normal muscle tone  Skin: Skin is warm and dry  Capillary refill takes less than 2 seconds  No rash noted  She is not diaphoretic  No erythema  No pallor  Psychiatric: She has a normal mood and affect  Her behavior is normal    Nursing note and vitals reviewed        ED Medications  Medications   sodium chloride 0 9 % bolus 1,000 mL (1,000 mL Intravenous New Bag 8/13/18 1705)   insulin lispro (HumaLOG) 100 units/mL subcutaneous injection 1-5 Units (not administered)   insulin lispro (HumaLOG) 100 units/mL subcutaneous injection 1-5 Units (not administered)   ceftriaxone (ROCEPHIN) 1 g/50 mL in dextrose IVPB (1,000 mg Intravenous New Bag 8/13/18 1705)   albuterol inhalation solution 5 mg (5 mg Nebulization Given 8/13/18 1708)   ipratropium (ATROVENT) 0 02 % inhalation solution 0 5 mg (0 5 mg Nebulization Given 8/13/18 1708)       Diagnostic Studies  Results Reviewed     Procedure Component Value Units Date/Time Procalcitonin [[id number]]  (Abnormal) Collected:  08/13/18 1645    Lab Status:  Final result Specimen:  Blood from Arm, Left Updated:  08/13/18 1741     Procalcitonin 0 29 (H) ng/ml     Blood culture #1 [[id number]] Collected:  08/13/18 1735    Lab Status: In process Specimen:  Blood from Arm, Right Updated:  08/13/18 1740    Strep Pneumoniae, Urine [[id number]] Collected:  08/13/18 1735    Lab Status: In process Specimen:  Urine from Urine, Clean Catch Updated:  08/13/18 1740    Gamma GT [[id number]]     Lab Status:  No result Specimen:  Blood     Hemoglobin A1C [[id number]]     Lab Status:  No result Specimen:  Blood     Blood culture #2 [[id number]] Collected:  08/13/18 1645    Lab Status: In process Specimen:  Blood from Arm, Left Updated:  08/13/18 1652    B-Type Natriuretic Peptide [facility] ONLY) [[id number]] Collected:  08/13/18 1645    Lab Status: In process Specimen:  Blood from Arm, Left Updated:  08/13/18 1651    Lactic acid x2 Q2H [[id number]] Collected:  08/13/18 1645    Lab Status: In process Specimen:  Blood from Arm, Left Updated:  08/13/18 1651    Lactic acid x2 Q2H [[id number]]     Lab Status:  No result Specimen:  Blood     Sputum culture and Gram stain [[id number]]     Lab Status:  No result Specimen:  Sputum     Legionella antigen, urine [[id number]]     Lab Status:  No result Specimen:  Urine     POCT urinalysis dipstick [[id number]]     Lab Status:  No result     CBC and differential [[id number]]  (Abnormal) Collected:  08/13/18 1502    Lab Status:  Final result Specimen:  Blood from Arm, Right Updated:  08/13/18 1613     WBC 23 25 (H) Thousand/uL      RBC 3 87 Million/uL      Hemoglobin 11 7 g/dL      Hematocrit 36 0 %      MCV 93 fL      MCH 30 2 pg      MCHC 32 5 g/dL      RDW 13 4 %      MPV 11 5 fL      Platelets 266 Thousands/uL      nRBC 0 /100 WBCs     Narrative: This is an appended report  These results have been appended to a previously verified report      Comprehensive metabolic panel [[id number]]  (Abnormal) Collected:  08/13/18 1502    Lab Status:  Final result Specimen:  Blood from Arm, Right Updated:  08/13/18 1534     Sodium 135 (L) mmol/L      Potassium 3 9 mmol/L      Chloride 100 mmol/L      CO2 27 mmol/L      Anion Gap 8 mmol/L      BUN 16 mg/dL      Creatinine 0 94 mg/dL      Glucose 239 (H) mg/dL      Calcium 9 9 mg/dL      AST 92 (H) U/L       (H) U/L      Alkaline Phosphatase 199 (H) U/L      Total Protein 7 9 g/dL      Albumin 2 6 (L) g/dL      Total Bilirubin 0 51 mg/dL      eGFR 61 ml/min/1 73sq m     Narrative:         National Kid[employer] Disease Education Program recommendations are as follows:  GFR calculation is accurate only with a steady state creatinine  Chronic Kidney disease less than 60 ml/min/1 73 sq  meters  Kidney failure less than 15 ml/min/1 73 sq  meters  Troponin I [[id number]]  (Normal) Collected:  08/13/18 1502    Lab Status:  Final result Specimen:  Blood from Arm, Right Updated:  08/13/18 1534     Troponin I <0 02 ng/mL                  X-ray chest 2 views   Final Result by Andre Tubbs MD (08/13 1704)      Bibasilar airspace opacities are worsened, concerning for multifocal pneumonia              Workstation performed: [id number]               Procedures  ECG 12 Lead Documentation  Date/Time: 8/13/2018 4:43 PM  Performed by: Zaynab Golden by: Hansel Zhou     ECG reviewed by me, the ED Provider: yes    Patient location:  ED  Previous ECG:     Previous ECG:  Compared to current    Similarity:  Changes noted  Interpretation:     Interpretation: non-specific    Rate:     ECG rate:  91    ECG rate assessment: normal    Rhythm:     Rhythm: sinus rhythm    Ectopy:     Ectopy: none    QRS:     QRS axis:  Normal    QRS intervals:  Normal  Conduction:     Conduction: normal    T waves:     T waves: inverted      Inverted:  III            Phone Consults  ED Phone Contact    ED Course           Identification of Seniors at Risk      Most Recent Value   (ISAR) Identification of Seniors at Risk   Before the illness or injury that brought you to the Emergency, did you need someone to help you on a regular basis? 0 Filed at: 08/13/2018 1454   In the last 24 hours, have you needed more help than usual?  0 Filed at: 08/13/2018 1454   Have you been hospitalized for one or more nights during the past 6 months? 0 Filed at: 08/13/2018 1454   In general, do you see well?  0 Filed at: 08/13/2018 1454   In general, do you have serious problems with your memory? 0 Filed at: 08/13/2018 1454   Do you take more than three different medications every day? 1 Filed at: 08/13/2018 1454   ISAR Score  1 Filed at: 08/13/2018 1454                    Initial Sepsis Screening     Row Name 08/13/18 6554                Is the patient's history suggestive of a new or worsening infection? (!)  Yes (Proceed)  -HB        Suspected source of infection pneumonia  -HB        Are two or more of the following signs & symptoms of infection both present and new to the patient? (!)  Yes (Proceed)  -HB        Indicate SIRS criteria Leukocytosis (WBC > 30985 IJL); Tachycardia > 90 bpm;Tachypnea > 20 resp per min  -HB        If the answer is yes to both questions, suspicion of sepsis is present          If severe sepsis is present AND tissue hypoperfusion perists in the hour after fluid resuscitation or lactate > 4, the patient meets criteria for SEPTIC SHOCK          Are any of the following organ dysfunction criteria present within 6 hours of suspected infection and SIRS criteria that are NOT considered to be chronic conditions? (!)  Yes  -HB        Organ dysfunction          Date of presentation of severe sepsis          Time of presentation of severe sepsis          Tissue hypoperfusion persists in the hour after crystalloid fluid administration, evidenced, by either:          Was hypotension present within one hour of the conclusion of crystalloid fluid administration?           Date of presentation of septic shock          Time of presentation of septic shock            User Key  (r) = Recorded By, (t) = Taken By, (c) = Cosigned By    Initials Name Provider Type    CHELSEA Moore DO Resident           Default Flowsheet Data (last 720 hours)      Sepsis Reassess     Row Name 08/13/18 5826                   Repeat Volume Status and Tissue Perfusion Assessment Performed    Repeat Volume Status and Tissue Perfusion Assessment Performed Yes  -HB           Volume Status and Tissue Perfusion Post Fluid Resuscitation- Must Document 5 of the Following 7:    Vital Signs Reviewed (HR, RR, BP, T) Yes  -HB        Arterial Oxygen Saturation Reviewed (POx, SaO2 or SpO2) Yes (comment %)   92%  -HB        Cardio Normal S1/S2; Regular rate and rhythm; No murmor; No rub or gallop  -HB        Pulmonary (!)  Rales; Wheezes; Tachypnea  -HB        Capillary Refill Brisk  -HB        Peripheral Pulses Radial  -HB        Peripheral Pulse +2  -HB        Skin Warm;Dry  -HB        Urine output assessed Adequate  -HB           *OR*   Intensive Monitoring- Must Document One of the Following Four *:    Vital Signs Reviewed Yes  -HB        * Central Venous Pressure (CVP or RAP)          * Central Venous Oxygen (SVO2, ScvO2 or Oxygen saturation via central catheter)          * Bedside Cardiovascular US in IVC diameter and % collapse          * Passive Leg Raise OR Crystalloid Challenge            User Key  (r) = Recorded By, (t) = Taken By, (c) = Cosigned By    Initials Name Provider Type    CHELSEA Moore DO Resident                MDM  Number of Diagnoses or Management Options  Hypoxia: new and requires workup  Leukocytosis:   Right lower lobe pneumonia ([address] ): new and requires workup  Tachypnea: new and requires workup  Diagnosis management comments: 1    Patient has failed outpatient therapy for right lower lobe pneumonia, she will have to be admitted to the hospital for IV antibiotics and worsening of her hypoxia and dyspnea with tachypnea  1 g IV Versed 7 while in the emergency department  1 L IV fluid bolus, sepsis workup  Chest x-ray reveals right lower lobe pneumonia  Patient oxygenating in the low 90s on 3 L currently  A CBC reveals leukocytosis with a white count greater than 20,000  2   Nebulized albuterol and Atrovent while in the emergency department for continued wheezing  EKG reveals normal sinus rhythm, with new inverted T-waves in lead 3  Initial troponins negative  The patient's alk phosphatase is elevated, however the patient has been on oral prednisone which may explain her elevated alk-phos  3   Spoke with SOB, patient to be admitted to the hospital for failed outpatient therapy treatment for right lower lobe pneumonia  CritCare Time    Disposition  Final diagnoses:   Right lower lobe pneumonia ([address] )   Hypoxia   Leukocytosis   Tachypnea     Time reflects when diagnosis was documented in both MDM as applicable and the Disposition within this note     Time User Action Codes Description Comment    8/13/2018  4:40 PM Ferna Hoots Add [J18 1] Right lower lobe pneumonia ([address] )     8/13/2018  4:40 PM Ferna Hoots Add [R09 02] Hypoxia     8/13/2018  4:40 PM Ferna Hoots Add [D72 829] Leukocytosis     8/13/2018  4:41 PM Ferna Hoots Add [R06 82] Tachypnea       ED Disposition     ED Disposition Condition Comment    Admit  Case was discussed with SOD and the patient's admission status was agreed to be Admission Status: observation status to the service of Dr Eusebio Bender   Follow-up Information    None         Patient's Medications    No medications on file     No discharge procedures on file  ED Provider  Attending physically available and evaluated Paulo So I managed the patient along with the ED Attending      Electronically Signed by         Tyshawn Mascorro DO  08/13/18 3735

## 2018-08-13 NOTE — H&P
INTERNAL MEDICINE HISTORY AND PHYSICAL  ED 29 SOD Team C     NAME: Ting Estevez  AGE: 67 y o  SEX: female  : 1945   MRN: [id number]  ENCOUNTER: [id number]    DATE: 2018  TIME: 6:14 PM    Primary Care Physician: Veronica Baron DO  Admitting Provider: Hiral Mullins MD    Chief complaint:  Productive cough, fatigue, shortness of breath, chills, "feeling shaky"    History of Present Illness     Nargis Varner is a 67 y o  female presenting with a chief complaint of fatigue, SOB, and feeling shaky since Friday  Patient has been feeling sick for 1 week with symptoms of sore throat and head congestion (which are now resolved), cough productive of yellow sputum, nasal congestion, shortness of breath, and fatigue  She has not measured her temperature, but she has had chills  She has been to her allergist Dr Mckeon twice in the last week for symptomatic treatment  She was prescribed Advair b i d, Ventolin q4h, a prednisone taper, and a Z-Liborio which she completed  Despite these treatments, patient was not feel better  She states she has had pneumonia before, "but not like this"  Of note, patient was in [place] at the end of July for wedding  On the way back she stopped in [place] to visit the family and she was around her grandkids  She feels like she could have gotten sick at this point  Patient reports getting regular vaccinations  She got the [address] in 2015  Patient is a lifetime nonsmoker (no smokers around her), rare drinker  Review of Systems   Review of Systems   Constitutional: Positive for chills and fatigue  HENT: Positive for congestion, rhinorrhea and sore throat  Negative for ear pain  Eyes: Negative  Respiratory: Positive for cough, chest tightness and shortness of breath  Cardiovascular: Negative  Gastrointestinal: Negative  Endocrine: Negative  Genitourinary: Negative  Musculoskeletal: Negative  Skin: Negative      Allergic/Immunologic: Positive for environmental allergies  Neurological: Negative  Hematological: Negative  Psychiatric/Behavioral: Negative  Past Medical History     Past Medical History:   Diagnosis Date    Allergic     environmental    Asthma     Pneumonia        Past Surgical History     Past Surgical History:   Procedure Laterality Date    BACK SURGERY      BREAST SURGERY      right biopsy    FOOT SURGERY      TONSILLECTOMY         Social History     History   Alcohol Use    Yes     Comment: rarely     History   Drug Use No     History   Smoking Status    Never Smoker   Smokeless Tobacco    Never Used       Family History     Family History   Problem Relation Age of Onset    No Known Problems Mother     No Known Problems Father        Medications Prior to Admission     Prior to Admission medications    Not on File       Allergies     Allergies   Allergen Reactions    No Active Allergies        Objective     Vitals:    08/13/18 1452 08/13/18 1652   BP: 113/63 108/56   BP Location: Left arm Left arm   Pulse: 105 86   Resp: (!) 24 (!) 24   Temp: 98 8 °F (37 1 °C)    TempSrc: Oral    SpO2: 91% 93%   Weight: 59 kg (130 lb)    Height: 5' 2" (1 575 m)      Body mass index is 23 78 kg/m²  No intake or output data in the 24 hours ending 08/13/18 1814  Invasive Devices          No matching active lines, drains, or airways        Physical Exam   Constitutional: She is oriented to person, place, and time  She appears well-developed and well-nourished  She appears distressed  HENT:   Head: Normocephalic and atraumatic  Mouth/Throat: Oropharynx is clear and moist and mucous membranes are normal  No oropharyngeal exudate  No tonsillar exudate  Eyes: Conjunctivae and EOM are normal  Pupils are equal, round, and reactive to light  No scleral icterus  Neck: Normal range of motion  Neck supple  No thyromegaly present  Cardiovascular: Normal rate, regular rhythm, normal heart sounds and intact distal pulses  Pulmonary/Chest: She has wheezes  She has rales  Abdominal: Soft  Normal appearance and bowel sounds are normal  She exhibits no distension  There is no tenderness  Lymphadenopathy:        Head (right side): No submental, no submandibular and no tonsillar adenopathy present  Head (left side): No submental, no submandibular and no tonsillar adenopathy present  She has no cervical adenopathy  Right cervical: No superficial cervical adenopathy present  Left cervical: No superficial cervical adenopathy present  Right: No supraclavicular adenopathy present  Left: No supraclavicular adenopathy present  Neurological: She is alert and oriented to person, place, and time  Skin: Skin is warm, dry and intact  She is not diaphoretic  No cyanosis  Nails show no clubbing  Psychiatric: She has a normal mood and affect  Her speech is normal and behavior is normal  Judgment and thought content normal        Lab Results: I have personally reviewed pertinent reports      CBC:   Results from last 7 days  Lab Units 08/13/18  1502   WBC Thousand/uL 23 25*   RBC Million/uL 3 87   HEMOGLOBIN g/dL 11 7   HEMATOCRIT % 36 0   MCV fL 93   MCH pg 30 2   MCHC g/dL 32 5   RDW % 13 4   MPV fL 11 5   PLATELETS Thousands/uL 309   NRBC AUTO /100 WBCs 0   LYMPHO PCT % 1*   MONO PCT MAN % 4   EOSINO PCT MANUAL % 0   BASOS PCT MAN % 0   EOS ABS MAN Thousand/uL 0 00     Chemistry Profile:   Results from last 7 days  Lab Units 08/13/18  1502   SODIUM mmol/L 135*   POTASSIUM mmol/L 3 9   CHLORIDE mmol/L 100   CO2 mmol/L 27   ANION GAP mmol/L 8   BUN mg/dL 16   CREATININE mg/dL 0 94   GLUCOSE RANDOM mg/dL 239*   CALCIUM mg/dL 9 9   AST U/L 92*   ALT U/L 127*   ALK PHOS U/L 199*   TOTAL PROTEIN g/dL 7 9   BILIRUBIN TOTAL mg/dL 0 51   EGFR ml/min/1 73sq m 61      Results from last 7 days  Lab Units 08/13/18  1502   TROPONIN I ng/mL <0 02   , Additional Labs:   Results from last 7 days  Lab Units 08/13/18  1645 LACTIC ACID mmol/L 2 2*     Imaging: I have personally reviewed pertinent films in PACS  X-ray Chest 2 Views  Result Date: 8/13/2018  Narrative: CHEST INDICATION:   chest pain  COMPARISON:  X-ray 8/10/2018 EXAM   Impression: Bibasilar airspace opacities are worsened, concerning for multifocal pneumonia  Xr Chest Pa & Lateral  Result Date: 8/11/2018  Impression: Question subtle right lung base infiltrate/pneumonitis  Short interval follow-up in 2-3 weeks recommended after treatment  Microbiology: cultures obtained in emergency department include MRSA culture, urinary strep pneumo and Legionella antigens, blood cultures, sputum culture    EKG, Pathology, and Other Studies: I have personally reviewed pertinent reports        Medications given in Emergency Department     Medication Administration - last 24 hours from 08/12/2018 1814 to 08/13/2018 1814       Date/Time Order Dose Route Action Action by     08/13/2018 1705 ceftriaxone (ROCEPHIN) 1 g/50 mL in dextrose IVPB 1,000 mg Intravenous New [address] [facility] Judie Pastures, RN     08/13/2018 1705 sodium chloride 0 9 % bolus 1,000 mL 1,000 mL Intravenous New [address] [facility] Judie Pastures, RN     08/13/2018 1708 albuterol inhalation solution 5 mg 5 mg Nebulization Given Savage Villeda RN     08/13/2018 1708 ipratropium (ATROVENT) 0 02 % inhalation solution 0 5 mg 0 5 mg Nebulization Given Savage Villeda RN          Assessment and Plan     Problem List     * (Principal)CAP (community acquired pneumonia)    Hyperglycemia    Transaminitis    Alkaline phosphatase elevation    SOB (shortness of breath)    Environmental allergies (Chronic)        Community-acquired pneumonia  · WBC 23,000, procalcitonin 0 29, productive cough, and no recent exposure to healthcare facility makes this presentation highly likely of CAP  · CXR concerning for multifocal pneumonia  · S pneumo and Legionella urinary antigen pending  · Blood and sputum cultures  · Completed Z-Liborio and partial steroid taper from outpatient allergist without improvement, hold IP azithromycin and steroids  · 1 g IV ceftriaxone daily  · Trend daily procalcitonin    Lactic acidosis  · 8/13: 2 2, likely secondary to current infection  · Start IVF at 125 mL/hour  · Trend daily until resolved    Shortness of breath  · BNP pending, CXR did not show pulmonary edema or effusions, so less likely to be HF-related, especially given lack of cardiac history  · May 2017 echo was normal  · Receiving 2 L oxygen by nasal cannula, patient states it makes her feel better  · Troponin negative    Hyperglycemia  · No history of diabetes, could be an acute phase reactant  · Will get an A1c  · Subcutaneous insulin and sliding scale ordered  · Carb controlled diet    Transaminitis, elevated ALP  · AST/ALT 92/127, alp 199, normal bilirubin, normal platelets  · No abdominal pain, nausea/vomiting  · Will get a GGT to assess for etiology of elevated ALP  · No need to pursue abdominal imaging at this time, unless clinical picture changes     Asthma  · Patient has mild asthma which rarely requires medications  · Prescribed Advair, Ventolin, prednisone, Z-Liborio over the last week without symptomatic relief  · No intervention for now  · Managed by Dr Mckeon ([phone])      Code Status: Level 1 - Full Code  VTE Pharmacologic Prophylaxis: Enoxaparin (Lovenox)   VTE Mechanical Prophylaxis: sequential compression device  Admission Status: Fortunato Cornelius MD  Internal Medicine  PGY-1

## 2018-08-14 LAB
ALBUMIN SERPL BCP-MCNC: 2 G/DL (ref 3.5–5)
ALP SERPL-CCNC: 166 U/L (ref 46–116)
ALT SERPL W P-5'-P-CCNC: 101 U/L (ref 12–78)
ANION GAP SERPL CALCULATED.3IONS-SCNC: 6 MMOL/L (ref 4–13)
AST SERPL W P-5'-P-CCNC: 50 U/L (ref 5–45)
BASOPHILS # BLD AUTO: 0.02 THOUSANDS/ΜL (ref 0–0.1)
BASOPHILS NFR BLD AUTO: 0 % (ref 0–1)
BILIRUB SERPL-MCNC: 0.19 MG/DL (ref 0.2–1)
BUN SERPL-MCNC: 14 MG/DL (ref 5–25)
CALCIUM SERPL-MCNC: 8.6 MG/DL (ref 8.3–10.1)
CHLORIDE SERPL-SCNC: 106 MMOL/L (ref 100–108)
CO2 SERPL-SCNC: 28 MMOL/L (ref 21–32)
CREAT SERPL-MCNC: 0.66 MG/DL (ref 0.6–1.3)
EOSINOPHIL # BLD AUTO: 0.01 THOUSAND/ΜL (ref 0–0.61)
EOSINOPHIL NFR BLD AUTO: 0 % (ref 0–6)
ERYTHROCYTE [DISTWIDTH] IN BLOOD BY AUTOMATED COUNT: 13.7 % (ref 11.6–15.1)
GFR SERPL CREATININE-BSD FRML MDRD: 89 ML/MIN/1.73SQ M
GLUCOSE SERPL-MCNC: 120 MG/DL (ref 65–140)
GLUCOSE SERPL-MCNC: 121 MG/DL (ref 65–140)
GLUCOSE SERPL-MCNC: 137 MG/DL (ref 65–140)
GLUCOSE SERPL-MCNC: 137 MG/DL (ref 65–140)
GLUCOSE SERPL-MCNC: 161 MG/DL (ref 65–140)
HCT VFR BLD AUTO: 33.6 % (ref 34.8–46.1)
HGB BLD-MCNC: 10.7 G/DL (ref 11.5–15.4)
IMM GRANULOCYTES # BLD AUTO: 0.26 THOUSAND/UL (ref 0–0.2)
IMM GRANULOCYTES NFR BLD AUTO: 2 % (ref 0–2)
LACTATE SERPL-SCNC: 1.9 MMOL/L (ref 0.5–2)
LYMPHOCYTES # BLD AUTO: 1.13 THOUSANDS/ΜL (ref 0.6–4.47)
LYMPHOCYTES NFR BLD AUTO: 6 % (ref 14–44)
MCH RBC QN AUTO: 30.5 PG (ref 26.8–34.3)
MCHC RBC AUTO-ENTMCNC: 31.8 G/DL (ref 31.4–37.4)
MCV RBC AUTO: 96 FL (ref 82–98)
MONOCYTES # BLD AUTO: 0.76 THOUSAND/ΜL (ref 0.17–1.22)
MONOCYTES NFR BLD AUTO: 4 % (ref 4–12)
NEUTROPHILS # BLD AUTO: 15.72 THOUSANDS/ΜL (ref 1.85–7.62)
NEUTS SEG NFR BLD AUTO: 88 % (ref 43–75)
NRBC BLD AUTO-RTO: 0 /100 WBCS
PLATELET # BLD AUTO: 288 THOUSANDS/UL (ref 149–390)
PMV BLD AUTO: 11.2 FL (ref 8.9–12.7)
POTASSIUM SERPL-SCNC: 4.2 MMOL/L (ref 3.5–5.3)
PROCALCITONIN SERPL-MCNC: 0.25 NG/ML
PROT SERPL-MCNC: 6.6 G/DL (ref 6.4–8.2)
RBC # BLD AUTO: 3.51 MILLION/UL (ref 3.81–5.12)
SODIUM SERPL-SCNC: 140 MMOL/L (ref 136–145)
WBC # BLD AUTO: 17.9 THOUSAND/UL (ref 4.31–10.16)

## 2018-08-14 PROCEDURE — 85025 COMPLETE CBC W/AUTO DIFF WBC: CPT | Performed by: INTERNAL MEDICINE

## 2018-08-14 PROCEDURE — 99226 PR SBSQ OBSERVATION CARE/DAY 35 MINUTES: CPT | Performed by: INTERNAL MEDICINE

## 2018-08-14 PROCEDURE — 94760 N-INVAS EAR/PLS OXIMETRY 1: CPT

## 2018-08-14 PROCEDURE — 80053 COMPREHEN METABOLIC PANEL: CPT | Performed by: INTERNAL MEDICINE

## 2018-08-14 PROCEDURE — 84145 PROCALCITONIN (PCT): CPT | Performed by: INTERNAL MEDICINE

## 2018-08-14 PROCEDURE — 83605 ASSAY OF LACTIC ACID: CPT | Performed by: STUDENT IN AN ORGANIZED HEALTH CARE EDUCATION/TRAINING PROGRAM

## 2018-08-14 PROCEDURE — 82948 REAGENT STRIP/BLOOD GLUCOSE: CPT

## 2018-08-14 RX ORDER — BENZONATATE 100 MG/1
100 CAPSULE ORAL 3 TIMES DAILY PRN
Status: DISCONTINUED | OUTPATIENT
Start: 2018-08-14 | End: 2018-08-15

## 2018-08-14 RX ORDER — ALBUTEROL SULFATE 2.5 MG/3ML
2.5 SOLUTION RESPIRATORY (INHALATION)
Status: DISCONTINUED | OUTPATIENT
Start: 2018-08-14 | End: 2018-08-14

## 2018-08-14 RX ORDER — FLUTICASONE FUROATE AND VILANTEROL 200; 25 UG/1; UG/1
1 POWDER RESPIRATORY (INHALATION)
Status: DISCONTINUED | OUTPATIENT
Start: 2018-08-14 | End: 2018-08-17 | Stop reason: HOSPADM

## 2018-08-14 RX ORDER — GUAIFENESIN 600 MG
600 TABLET, EXTENDED RELEASE 12 HR ORAL EVERY 12 HOURS SCHEDULED
Status: DISCONTINUED | OUTPATIENT
Start: 2018-08-14 | End: 2018-08-17 | Stop reason: HOSPADM

## 2018-08-14 RX ORDER — ALBUTEROL SULFATE 2.5 MG/3ML
2.5 SOLUTION RESPIRATORY (INHALATION) EVERY 4 HOURS PRN
Status: DISCONTINUED | OUTPATIENT
Start: 2018-08-14 | End: 2018-08-17 | Stop reason: HOSPADM

## 2018-08-14 RX ADMIN — ENOXAPARIN SODIUM 40 MG: 40 INJECTION SUBCUTANEOUS at 17:16

## 2018-08-14 RX ADMIN — GUAIFENESIN 600 MG: 600 TABLET, EXTENDED RELEASE ORAL at 20:35

## 2018-08-14 RX ADMIN — ENOXAPARIN SODIUM 40 MG: 40 INJECTION SUBCUTANEOUS at 08:53

## 2018-08-14 RX ADMIN — INSULIN LISPRO 1 UNITS: 100 INJECTION, SOLUTION INTRAVENOUS; SUBCUTANEOUS at 21:40

## 2018-08-14 RX ADMIN — BENZONATATE 100 MG: 100 CAPSULE ORAL at 21:42

## 2018-08-14 RX ADMIN — FLUTICASONE FUROATE AND VILANTEROL TRIFENATATE 1 PUFF: 200; 25 POWDER RESPIRATORY (INHALATION) at 13:45

## 2018-08-14 RX ADMIN — AZITHROMYCIN MONOHYDRATE 500 MG: 500 INJECTION, POWDER, LYOPHILIZED, FOR SOLUTION INTRAVENOUS at 13:14

## 2018-08-14 RX ADMIN — CEFTRIAXONE 1000 MG: 1 INJECTION, POWDER, FOR SOLUTION INTRAMUSCULAR; INTRAVENOUS at 17:16

## 2018-08-14 NOTE — RESTORATIVE TECHNICIAN NOTE
Restorative Specialist Mobility Note       Activity: Ambulate in pimentel, Chair  Assistive Device: None  Ambulation Response: Tolerated well (Please see flowsheets for 02 sats)  Repositioned: Sitting, Up in chair  Range of Motion: Active, All extremities  Anti-Embolism Device On: Bilateral, Sequential compression devices, below knee     Patient left resting comfortably in bedside recliner, with call bell and table within reach

## 2018-08-14 NOTE — RESPIRATORY THERAPY NOTE
RT Protocol Note  Kamari Marie 67 y o  female MRN: [id number]  Unit/Bed#: [facility] 902-01 Encounter: [id number]    Assessment    Principal Problem:    CAP (community acquired pneumonia)  Active Problems:    Hyperglycemia    Transaminitis    Alkaline phosphatase elevation    SOB (shortness of breath)    Lactic acid acidosis      Home Pulmonary Medications:  reviewed       Past Medical History:   Diagnosis Date    Allergic     environmental    Asthma     Pneumonia      Social History     Social History    Marital status: /Civil Union     Spouse name: N/A    Number of children: N/A    Years of education: N/A     Social History Main Topics    Smoking status: Never Smoker    Smokeless tobacco: Never Used    Alcohol use Yes      Comment: rarely    Drug use: No    Sexual activity: Not Asked     Other Topics Concern    None     Social History Narrative    None       Subjective         Objective    Physical Exam:   Assessment Type: Assess only  General Appearance: Alert, Awake  Respiratory Pattern: Normal  Chest Assessment: Chest expansion symmetrical  Bilateral Breath Sounds: Clear (anterior)  R Breath Sounds: Crackles (posterior bases)  L Breath Sounds: Crackles (posterior bases)  Cough: Strong, Congested  O2 Device: nasal cannula    Vitals:  Blood pressure 109/54, pulse 64, temperature 97 7 °F (36 5 °C), temperature source Oral, resp  rate 20, height 5' 2" (1 575 m), weight 59 kg (130 lb), SpO2 94 %  Imaging and other studies: I have personally reviewed pertinent reports  O2 Device: nasal cannula     Plan    Respiratory Plan: No distress/Pulmonary history, Home Bronchodilator Patient pathway        Resp Comments: Pt assessed per respiratory protocol  Pt currently not in acute resp distress, pattern regular/unlabored and denies dyspnea  Pt recently ordered Avair BID and albuterol Q4 by her allergist  States that she didnt feel any better despite the respiratory medications   Breath sounds clear anteriorly, however late inspiratory crackles in the posterior bases bilaterally  Instructed her on incentive spirometry therapy  History of mild asthma per patient  Non smoking history noted  Based on the physical assessment and medical history, albuterol ordered Q4prn and continue to encourage IS therapy  Also continue to monitor and assess daily per respiratory protocol

## 2018-08-14 NOTE — SEPSIS NOTE
Sepsis Note   Chiquita Clayton 67 y o  female MRN: [id number]  Unit/Bed#: [facility] 902-01 Encounter: [id number]            Initial Sepsis Screening     Row Name 08/13/18 1644                Is the patient's history suggestive of a new or worsening infection? (!)  Yes (Proceed)  -HB        Suspected source of infection pneumonia  -HB        Are two or more of the following signs & symptoms of infection both present and new to the patient? (!)  Yes (Proceed)  -HB        Indicate SIRS criteria Leukocytosis (WBC > 26146 IJL); Tachycardia > 90 bpm;Tachypnea > 20 resp per min  -HB        If the answer is yes to both questions, suspicion of sepsis is present          If severe sepsis is present AND tissue hypoperfusion perists in the hour after fluid resuscitation or lactate > 4, the patient meets criteria for SEPTIC SHOCK          Are any of the following organ dysfunction criteria present within 6 hours of suspected infection and SIRS criteria that are NOT considered to be chronic conditions? (!)  Yes  -HB        Organ dysfunction          Date of presentation of severe sepsis          Time of presentation of severe sepsis          Tissue hypoperfusion persists in the hour after crystalloid fluid administration, evidenced, by either:          Was hypotension present within one hour of the conclusion of crystalloid fluid administration?           Date of presentation of septic shock          Time of presentation of septic shock            User Key  (r) = Recorded By, (t) = Taken By, (c) = Cosigned By    [address] Name Provider Type    HB Elizabeth Singer DO Resident               Default [facility] (last 720 hours)      Sepsis Reassess     [address] Name 08/14/18 0105 08/13/18 1731                Repeat Volume Status and Tissue Perfusion Assessment Performed    Repeat Volume Status and Tissue Perfusion Assessment Performed Yes  -SC Yes  -HB          Volume Status and Tissue Perfusion Post Fluid Resuscitation- Must Document 5 of the Following 7:    Vital Signs Reviewed (HR, RR, BP, T) Yes  -SC Yes  -HB       Arterial Oxygen Saturation Reviewed (POx, SaO2 or SpO2) Yes (comment %)  -SC Yes (comment %)   92%  -HB       Cardio Normal S1/S2; Regular rate and rhythm; No murmor; No rub or gallop  -SC Normal S1/S2; Regular rate and rhythm; No murmor; No rub or gallop  -HB       Pulmonary (!)  Rales; Wheezes  -SC (!)  Rales; Wheezes; Tachypnea  -HB       Capillary Refill Brisk  -SC Brisk  -HB       Peripheral Pulses Radial  -SC Radial  -HB       Peripheral Pulse +2  -SC +2  -HB       Skin Warm;Cool  -SC Warm;Dry  -HB       Urine output assessed Adequate  -SC Adequate  -HB          *OR*   Intensive Monitoring- Must Document One of the Following Four *:    Vital Signs Reviewed Yes  -SC Yes  -HB       * Central Venous Pressure (CVP or RAP)           * Central Venous Oxygen (SVO2, ScvO2 or Oxygen saturation via central catheter)           * Bedside Cardiovascular US in IVC diameter and % collapse           * Passive Leg Raise OR Crystalloid Challenge             User Key  (r) = Recorded By, (t) = Taken By, (c) = Cosigned By    Initials Name Provider Type    SC Eileen Harrison MD Resident    [address] [facility] Resident

## 2018-08-14 NOTE — SOCIAL WORK
CM met with Pt at bedside to discuss CM role at d/c  Pt reported to live with her , Thi Hartman), in a 2 story home with 1 KIERRA  Pt reported to be IPTA, does not use DME, and can drive  No hx with [facility]  Pt denied MH dx and no drug/etoh tx  Pt's PCP is Dr Roma Dumont  Pt uses [facility] in [place]  Pt's reported POA is son, Thi Hartman  Pt reported she will have her  bring in the documents  CM reviewed d/c planning process including the following: identifying help at home, patient preference for d/c planning needs, Discharge Lounge, [facility] Meds to Bed program, availability of treatment team to discuss questions or concerns patient and/or family may have regarding understanding medications and recognizing signs and symptoms once discharged  CM also encouraged patient to follow up with all recommended appointments after discharge  Patient advised of importance for patient and family to participate in managing patients medical well being

## 2018-08-14 NOTE — PROGRESS NOTES
IM Residency Progress Note   Unit/Bed#: [facility] 902-01 Encounter: [id number]  SOD Team C       Anuja Otto Topping 67 y o  female [id number]    Hospital Stay Days: 0      Assessment/Plan:    Principal Problem:    CAP (community acquired pneumonia)  Active Problems:    Hyperglycemia    Transaminitis    Alkaline phosphatase elevation    SOB (shortness of breath)    Lactic acid acidosis       Community-acquired pneumonia  · On presentation WBC 23,000, procalcitonin 0 29, LA 2 9, productive cough, and no recent exposure to healthcare facility makes this presentation highly likely of CAP  · CXR concerning for multifocal pneumonia  · S pneumo and Legionella urinary antigen negative  · Blood and sputum cultures pending  · WBC count trending down, has been afebrile, procal trended down  · Continue 1 g IV ceftriaxone daily     Lactic acidosis - resolved     Shortness of breath likely related to PNA  · BNP WNL, CXR did not show pulmonary edema or effusions, so less likely to be HF-related, especially given lack of cardiac history  · May 2017 echo was normal  · Receiving 2 L oxygen by nasal cannula, patient states it makes her feel better  · Troponin negative  · Wean down O2 to maintain sats > 92%     Hyperglycemia  · A1C 6 5% - this could be in part elevated due to recent steroids but likely has at least prediabetes  · Subcutaneous insulin and sliding scale ordered  · Carb controlled diet  · Will need repeat A1C as an outpatient     Transaminitis, elevated ALP  · AST/ALT 92/127, alp 199, normal bilirubin, normal platelets  · No abdominal pain, nausea/vomiting  · GGT elevated at 191  · No need to pursue abdominal imaging at this time, unless clinical picture changes      Asthma  · Patient has mild asthma which rarely requires medications  · Prescribed Advair, Ventolin, prednisone, Z-Liborio over the last week without symptomatic relief  · Respiratory protocol  · Managed by Dr Mckeon ([phone])    Disposition: Continue inpatient care    Subjective:          Vitals: Temp (24hrs), Av 1 °F (36 7 °C), Min:97 7 °F (36 5 °C), Max:98 8 °F (37 1 °C)  Current: Temperature: 98 3 °F (36 8 °C)  Vitals:    18 1930 18 2300 18 0246 18 0715   BP: 109/54 113/58 112/58 131/63   BP Location: Right arm Right arm Right arm Right arm   Pulse: 64 85 73 77   Resp: 20 21 20 20   Temp: 97 7 °F (36 5 °C) 97 8 °F (36 6 °C) 98 1 °F (36 7 °C) 98 3 °F (36 8 °C)   TempSrc: Oral Oral Oral Oral   SpO2: 94% 95% 96% 95%   Weight:       Height:        Body mass index is 23 78 kg/m²  I/O last 24 hours: In: 907 1 [P O :480; I V :377 1; IV Piggyback:50]  Out: -     Physical Exam   Constitutional: She is oriented to person, place, and time  She appears well-developed and well-nourished  HENT:   Head: Normocephalic and atraumatic  Eyes: No scleral icterus  Neck: No tracheal deviation present  Cardiovascular: Normal rate and regular rhythm  No murmur heard  Pulmonary/Chest: Effort normal  No stridor  No respiratory distress  She has no wheezes  Diffusely decreased breath sounds, dry cough noted   Abdominal: Soft  Bowel sounds are normal  She exhibits no distension  Musculoskeletal: She exhibits no edema  Neurological: She is alert and oriented to person, place, and time  Skin: Skin is warm and dry  Psychiatric: She has a normal mood and affect  Her behavior is normal    Nursing note and vitals reviewed          Invasive Devices     Peripheral Intravenous Line            Peripheral IV 18 Left Antecubital 1 day                          Labs:   Recent Results (from the past 24 hour(s))   ECG 12 lead    Collection Time: 18  2:57 PM   Result Value Ref Range    Ventricular Rate 91 BPM    Atrial Rate 91 BPM    NH Interval 126 ms    QRSD Interval 102 ms    QT Interval 342 ms    QTC Interval 420 ms    P [place] 73 degrees    QRS Axis -17 degrees    T Wave Axis 20 degrees   Comprehensive metabolic panel    Collection Time: 08/13/18  3:02 PM   Result Value Ref Range    Sodium 135 (L) 136 - 145 mmol/L    Potassium 3 9 3 5 - 5 3 mmol/L    Chloride 100 100 - 108 mmol/L    CO2 27 21 - 32 mmol/L    Anion Gap 8 4 - 13 mmol/L    BUN 16 5 - 25 mg/dL    Creatinine 0 94 0 60 - 1 30 mg/dL    Glucose 239 (H) 65 - 140 mg/dL    Calcium 9 9 8 3 - 10 1 mg/dL    AST 92 (H) 5 - 45 U/L     (H) 12 - 78 U/L    Alkaline Phosphatase 199 (H) 46 - 116 U/L    Total Protein 7 9 6 4 - 8 2 g/dL    Albumin 2 6 (L) 3 5 - 5 0 g/dL    Total Bilirubin 0 51 0 20 - 1 00 mg/dL    eGFR 61 ml/min/1 73sq m   CBC and differential    Collection Time: 08/13/18  3:02 PM   Result Value Ref Range    WBC 23 25 (H) 4 31 - 10 16 Thousand/uL    RBC 3 87 3 81 - 5 12 Million/uL    Hemoglobin 11 7 11 5 - 15 4 g/dL    Hematocrit 36 0 34 8 - 46 1 %    MCV 93 82 - 98 fL    MCH 30 2 26 8 - 34 3 pg    MCHC 32 5 31 4 - 37 4 g/dL    RDW 13 4 11 6 - 15 1 %    MPV 11 5 8 9 - 12 7 fL    Platelets 138 064 - 290 Thousands/uL    nRBC 0 /100 WBCs   Troponin I    Collection Time: 08/13/18  3:02 PM   Result Value Ref Range    Troponin I <0 02 <=0 04 ng/mL   Manual Differential(PHLEBS Do Not Order)    Collection Time: 08/13/18  3:02 PM   Result Value Ref Range    Segmented % 83 (H) 43 - 75 %    Bands % 11 (H) 0 - 8 %    Lymphocytes % 1 (L) 14 - 44 %    Monocytes % 4 4 - 12 %    Eosinophils % 0 0 - 6 %    Basophils % 0 0 - 1 %    Metamyelocytes% 1 0 - 1 %    Absolute Neutrophils 21 86 (H) 1 85 - 7 62 Thousand/uL    Lymphocytes Absolute 0 23 (L) 0 60 - 4 47 Thousand/uL    Monocytes Absolute 0 93 0 00 - 1 22 Thousand/uL    Eosinophils Absolute 0 00 0 00 - 0 40 Thousand/uL    Basophils Absolute 0 00 0 00 - 0 10 Thousand/uL    Total Counted      Platelet Estimate Adequate Adequate    Giant PLTs Present    Hemoglobin A1C    Collection Time: 08/13/18  3:02 PM   Result Value Ref Range    Hemoglobin A1C 6 5 (H) 4 2 - 6 3 %     mg/dl   Gamma GT    Collection Time: 08/13/18  3:02 PM   Result Value Ref Range     (H) 5 - 85 U/L   Legionella antigen, urine    Collection Time: 08/13/18  4:28 PM   Result Value Ref Range    Legionella Urinary Antigen Negative Negative   Procalcitonin    Collection Time: 08/13/18  4:45 PM   Result Value Ref Range    Procalcitonin 0 29 (H) <=0 25 ng/ml   B-Type Natriuretic Peptide [facility] ONLY)    Collection Time: 08/13/18  4:45 PM   Result Value Ref Range    BNP 80 1 - 100 pg/mL   Lactic acid x2 Q2H    Collection Time: 08/13/18  4:45 PM   Result Value Ref Range    LACTIC ACID 2 2 (HH) 0 5 - 2 0 mmol/L   Strep Pneumoniae, Urine    Collection Time: 08/13/18  5:35 PM   Result Value Ref Range    Strep pneumoniae antigen, urine Negative Negative   Fingerstick Glucose (POCT)    Collection Time: 08/13/18  9:02 PM   Result Value Ref Range    POC Glucose 322 (H) 65 - 140 mg/dl   Fingerstick Glucose (POCT)    Collection Time: 08/13/18  9:05 PM   Result Value Ref Range    POC Glucose 331 (H) 65 - 140 mg/dl   Lactic acid, plasma    Collection Time: 08/13/18  9:19 PM   Result Value Ref Range    LACTIC ACID 2 9 (HH) 0 5 - 2 0 mmol/L   Lactic acid, plasma    Collection Time: 08/14/18 12:39 AM   Result Value Ref Range    LACTIC ACID 1 9 0 5 - 2 0 mmol/L   Procalcitonin    Collection Time: 08/14/18  4:39 AM   Result Value Ref Range    Procalcitonin 0 25 <=0 25 ng/ml   Comprehensive metabolic panel    Collection Time: 08/14/18  4:39 AM   Result Value Ref Range    Sodium 140 136 - 145 mmol/L    Potassium 4 2 3 5 - 5 3 mmol/L    Chloride 106 100 - 108 mmol/L    CO2 28 21 - 32 mmol/L    Anion Gap 6 4 - 13 mmol/L    BUN 14 5 - 25 mg/dL    Creatinine 0 66 0 60 - 1 30 mg/dL    Glucose 121 65 - 140 mg/dL    Calcium 8 6 8 3 - 10 1 mg/dL    AST 50 (H) 5 - 45 U/L     (H) 12 - 78 U/L    Alkaline Phosphatase 166 (H) 46 - 116 U/L    Total Protein 6 6 6 4 - 8 2 g/dL    Albumin 2 0 (L) 3 5 - 5 0 g/dL    Total Bilirubin 0 19 (L) 0 20 - 1 00 mg/dL    eGFR 89 ml/min/1 73sq m   CBC and differential    Collection Time: 08/14/18  4:39 AM   Result Value Ref Range    WBC 17 90 (H) 4 31 - 10 16 Thousand/uL    RBC 3 51 (L) 3 81 - 5 12 Million/uL    Hemoglobin 10 7 (L) 11 5 - 15 4 g/dL    Hematocrit 33 6 (L) 34 8 - 46 1 %    MCV 96 82 - 98 fL    MCH 30 5 26 8 - 34 3 pg    MCHC 31 8 31 4 - 37 4 g/dL    RDW 13 7 11 6 - 15 1 %    MPV 11 2 8 9 - 12 7 fL    Platelets 578 353 - 108 Thousands/uL    nRBC 0 /100 WBCs    Neutrophils Relative 88 (H) 43 - 75 %    Immat GRANS % 2 0 - 2 %    Lymphocytes Relative 6 (L) 14 - 44 %    Monocytes Relative 4 4 - 12 %    Eosinophils Relative 0 0 - 6 %    Basophils Relative 0 0 - 1 %    Neutrophils Absolute 15 72 (H) 1 85 - 7 62 Thousands/µL    Immature Grans Absolute 0 26 (H) 0 00 - 0 20 Thousand/uL    Lymphocytes Absolute 1 13 0 60 - 4 47 Thousands/µL    Monocytes Absolute 0 76 0 17 - 1 22 Thousand/µL    Eosinophils Absolute 0 01 0 00 - 0 61 Thousand/µL    Basophils Absolute 0 02 0 00 - 0 10 Thousands/µL       Radiology Results: I have personally reviewed pertinent reports  Other Diagnostic Testing:   I have personally reviewed pertinent reports          Active Meds:   Current Facility-Administered Medications   Medication Dose Route Frequency    acetaminophen (TYLENOL) tablet 650 mg  650 mg Oral Q6H PRN    albuterol inhalation solution 2 5 mg  2 5 mg Nebulization Q4H PRN    ceftriaxone (ROCEPHIN) 1 g/50 mL in dextrose IVPB  1,000 mg Intravenous Q24H    enoxaparin (LOVENOX) subcutaneous injection 40 mg  40 mg Subcutaneous BID    insulin lispro (HumaLOG) 100 units/mL subcutaneous injection 1-5 Units  1-5 Units Subcutaneous TID AC    insulin lispro (HumaLOG) 100 units/mL subcutaneous injection 1-5 Units  1-5 Units Subcutaneous HS         VTE Pharmacologic Prophylaxis: Enoxaparin (Lovenox)  VTE Mechanical Prophylaxis: sequential compression device    Santo Donis DO

## 2018-08-14 NOTE — PLAN OF CARE
Problem: DISCHARGE PLANNING - CARE MANAGEMENT  Goal: Discharge to post-acute care or home with appropriate resources  INTERVENTIONS:  - Conduct assessment to determine patient/family and health care team treatment goals, and need for post-acute services based on payer coverage, community resources, and patient preferences, and barriers to discharge  - Address psychosocial, clinical, and financial barriers to discharge as identified in assessment in conjunction with the patient/family and health care team  - Arrange appropriate level of post-acute services according to patient's   needs and preference and payer coverage in collaboration with the physician and health care team  - Communicate with and update the patient/family, physician, and health care team regarding progress on the discharge plan  - Arrange appropriate transportation to post-acute venues  - Pt to d/c home with appropriate resources when medically stable   Outcome: Progressing

## 2018-08-14 NOTE — CASE MANAGEMENT
Initial Clinical Review    Admission: Date/Time/Statement: Observation 8/13 @ 1643     Orders Placed This Encounter   Procedures    Place in Observation (expected length of stay for this patient is less than two midnights)     Standing Status:   Standing     Number of Occurrences:   1     Order Specific Question:   Admitting Physician     Answer:   Jose Neves [[id number]]     Order Specific Question:   Level of Care     Answer:   Med Surg [[id number]]       ED: Date/Time/Mode of Arrival:   ED Arrival Information     Expected Arrival Acuity Means of Arrival Escorted By Service Admission Type    - 8/13/2018 14:39 Emergent Walk-In Self Surgery-General Emergency    Arrival Complaint    r/o pneumonia          Chief Complaint:   Chief Complaint   Patient presents with    Shortness of Breath     Pt states "my allergist sent me in here because I have bronchitis and pneumonia " Pt c/o SOB with a cough with "thick yellow phlegm" and being "totally exhausted "       History of Illness: 67 y o  female presenting with a chief complaint of fatigue, SOB, and feeling shaky since Friday  Patient has been feeling sick for 1 week with symptoms of sore throat and head congestion (which are now resolved), cough productive of yellow sputum, nasal congestion, shortness of breath, and fatigue  She has not measured her temperature, but she has had chills  She has been to her allergist Dr Mckeon twice in the last week for symptomatic treatment  She was prescribed Advair b i d, Ventolin q4h, a prednisone taper, and a Z-Liborio which she completed  Despite these treatments, patient was not feel better  She states she has had pneumonia before, "but not like this"  Of note, patient was in [place] at the end of July for wedding  On the way back she stopped in [place] to visit the family and she was around her grandkids  She feels like she could have gotten sick at this point  Patient reports getting regular vaccinations   She got the [id number] in June 2015  Patient is a lifetime nonsmoker (no smokers around her), rare drinker  ED Vital Signs:   ED Triage Vitals [08/13/18 1452]   Temperature Pulse Respirations Blood Pressure SpO2   98 8 °F (37 1 °C) 105 (!) 24 113/63 91 %      Temp Source Heart Rate Source Patient Position - Orthostatic VS BP Location FiO2 (%)   Oral Monitor Sitting Left arm --      Pain Score       No Pain        Wt Readings from Last 1 Encounters:   08/13/18 59 kg (130 lb)     O2 2L N/C 95%    Vital Signs (abnormal): WNL    Abnormal Labs:    08/13/18 1502    Sodium 136 - 145 mmol/L 135     Potassium 3 5 - 5 3 mmol/L 3 9    Chloride 100 - 108 mmol/L 100    CO2 21 - 32 mmol/L 27    Anion Gap 4 - 13 mmol/L 8    BUN 5 - 25 mg/dL 16    Creatinine 0 60 - 1 30 mg/dL 0 94    Glucose 65 - 140 mg/dL 239     Calcium 8 3 - 10 1 mg/dL 9 9    AST 5 - 45 U/L 92     ALT 12 - 78 U/L 127     Alkaline Phosphatase 46 - 116 U/L 199     Total Protein 6 4 - 8 2 g/dL 7 9    Albumin 3 5 - 5 0 g/dL 2 6     Total Bilirubin 0 20 - 1 00 mg/dL 0 51    eGFR ml/min/1 73sq m 61       08/13/18 1502    WBC 4 31 - 10 16 Thousand/uL 23 25       08/13/18 2119     LACTIC ACID 0 5 - 2 0 mmol/L 2 9        08/14/18 0439    Hemoglobin 11 5 - 15 4 g/dL 10 7     Hematocrit 34 8 - 46 1 % 33 6         Diagnostic Test Results: CXR - Question subtle right lung base infiltrate/pneumonitis    Short interval follow-up in 2-3 weeks recommended after treatment      ED Treatment:   Medication Administration from 08/13/2018 1439 to 08/13/2018 1923       Date/Time Order Dose Route Action     08/13/2018 1705 ceftriaxone (ROCEPHIN) 1 g/50 mL in dextrose IVPB 1,000 mg Intravenous New Bag     08/13/2018 1705 sodium chloride 0 9 % bolus 1,000 mL 1,000 mL Intravenous New Bag     08/13/2018 1708 albuterol inhalation solution 5 mg 5 mg Nebulization Given     08/13/2018 1708 ipratropium (ATROVENT) 0 02 % inhalation solution 0 5 mg 0 5 mg Nebulization Given          Past Medical/Surgical History: Active Ambulatory Problems     Diagnosis Date Noted    Environmental allergies 08/13/2018    Mild asthma 08/13/2018     Resolved Ambulatory Problems     Diagnosis Date Noted    No Resolved Ambulatory Problems     Past Medical History:   Diagnosis Date    Allergic     Asthma     Pneumonia        Admitting Diagnosis: Tachypnea [R06 82]  Leukocytosis [D72 829]  SOB (shortness of breath) [R06 02]  Hypoxia [R09 02]  Right lower lobe pneumonia (HCC) [J18 1]    Age/Sex: 67 y o  female    Assessment/Plan:   Problem List      * (Principal)CAP (community acquired pneumonia)     Hyperglycemia     Transaminitis     Alkaline phosphatase elevation     SOB (shortness of breath)     Environmental allergies (Chronic)          Community-acquired pneumonia  · WBC 23,000, procalcitonin 0 29, productive cough, and no recent exposure to healthcare facility makes this presentation highly likely of CAP  · CXR concerning for multifocal pneumonia  · S pneumo and Legionella urinary antigen pending  · Blood and sputum cultures  · Completed Z-Liborio and partial steroid taper from outpatient allergist without improvement, hold IP azithromycin and steroids  · 1 g IV ceftriaxone daily  · Trend daily procalcitonin     Lactic acidosis  · 8/13: 2 2, likely secondary to current infection  · Start IVF at 125 mL/hour  · Trend daily until resolved     Shortness of breath  · BNP pending, CXR did not show pulmonary edema or effusions, so less likely to be HF-related, especially given lack of cardiac history  · May 2017 echo was normal  · Receiving 2 L oxygen by nasal cannula, patient states it makes her feel better  · Troponin negative     Hyperglycemia  · No history of diabetes, could be an acute phase reactant  · Will get an A1c  · Subcutaneous insulin and sliding scale ordered  · Carb controlled diet     Transaminitis, elevated ALP  · AST/ALT 92/127, alp 199, normal bilirubin, normal platelets  · No abdominal pain, nausea/vomiting  · Will get a GGT to assess for etiology of elevated ALP  · No need to pursue abdominal imaging at this time, unless clinical picture changes      Asthma  · Patient has mild asthma which rarely requires medications  · Prescribed Advair, Ventolin, prednisone, Z-Liborio over the last week without symptomatic relief  · No intervention for now  · Managed by Dr Mckeon ([phone])      Code Status: Level 1 - Full Code  VTE Pharmacologic Prophylaxis: Enoxaparin (Lovenox)   VTE Mechanical Prophylaxis: sequential compression device      Admission Orders:  Tele monitoring  Bld culture x2  Sputum culture and Gram stain    Scheduled Meds:   Current Facility-Administered Medications:  cefTRIAXone 1,000 mg Intravenous Q24H   enoxaparin 40 mg Subcutaneous BID   insulin lispro 1-5 Units Subcutaneous TID AC   insulin lispro 1-5 Units Subcutaneous HS     Continuous Infusions:     sodium chloride 0 9 % infusion  Rate: 125 mL/hr Dose: 125 mL/hr  Freq: Continuous Route: IV      PRN Meds:   acetaminophen    albuterol    -----------------------------------------------------------------------------------------------------------------------------    8/15 Physician progress notes:  Assessment/Plan:     Principal Problem:    CAP (community acquired pneumonia)  Active Problems:    Hyperglycemia    Transaminitis    Alkaline phosphatase elevation    SOB (shortness of breath)    Lactic acid acidosis     Community-acquired pneumonia  · WBC count trending down, patient was afebrile  · S pneumo and Legionella urinary antigen negative  · Blood cultures negative at 24 hours  · Continue 1 g IV ceftriaxone daily  · Continue IV Azithromycin 500mg daily  · Tessalon perles PRN for spastic cough     Lactic acidosis - resolved     Shortness of breath likely related to PNA  · BNP WNL, CXR did not show pulmonary edema or effusions, so less likely to be HF-related, especially given lack of cardiac history  · May 2017 echo was normal  · Receiving 2 L oxygen by nasal cannula, patient states it makes her feel better  · Troponin negative  · Wean down O2 to maintain sats > 92%     Hyperglycemia  · A1C 6 5% - this could be in part elevated due to recent steroids but likely has at least prediabetes  · Subcutaneous insulin and sliding scale ordered  · Carb controlled diet  · Will need repeat A1C as an outpatient     Transaminitis, elevated ALP  · AST/ALT 92/127, alp 199, normal bilirubin, normal platelets  · No abdominal pain, nausea/vomiting  · GGT elevated at 191  · No need to pursue abdominal imaging at this time, unless clinical picture changes      Asthma  · Patient has mild asthma which rarely requires medications  · Continue breo-ellipta  · Respiratory protocol  · Managed by Dr Mckeon ([phone])     Disposition: Continue inpatient care with IV antibiotics  Continue to wean down O2 and anticipate patient may be able to be discharged home tomorrow or Friday      O2 2L N/C 90%

## 2018-08-15 LAB
ANION GAP SERPL CALCULATED.3IONS-SCNC: 8 MMOL/L (ref 4–13)
BUN SERPL-MCNC: 12 MG/DL (ref 5–25)
CALCIUM SERPL-MCNC: 8.8 MG/DL (ref 8.3–10.1)
CHLORIDE SERPL-SCNC: 103 MMOL/L (ref 100–108)
CO2 SERPL-SCNC: 27 MMOL/L (ref 21–32)
CREAT SERPL-MCNC: 0.64 MG/DL (ref 0.6–1.3)
ERYTHROCYTE [DISTWIDTH] IN BLOOD BY AUTOMATED COUNT: 13.8 % (ref 11.6–15.1)
GFR SERPL CREATININE-BSD FRML MDRD: 89 ML/MIN/1.73SQ M
GLUCOSE P FAST SERPL-MCNC: 109 MG/DL (ref 65–99)
GLUCOSE SERPL-MCNC: 109 MG/DL (ref 65–140)
GLUCOSE SERPL-MCNC: 135 MG/DL (ref 65–140)
GLUCOSE SERPL-MCNC: 167 MG/DL (ref 65–140)
GLUCOSE SERPL-MCNC: 184 MG/DL (ref 65–140)
GLUCOSE SERPL-MCNC: 250 MG/DL (ref 65–140)
HCT VFR BLD AUTO: 35.1 % (ref 34.8–46.1)
HGB BLD-MCNC: 11 G/DL (ref 11.5–15.4)
MCH RBC QN AUTO: 30.6 PG (ref 26.8–34.3)
MCHC RBC AUTO-ENTMCNC: 31.3 G/DL (ref 31.4–37.4)
MCV RBC AUTO: 98 FL (ref 82–98)
PLATELET # BLD AUTO: 273 THOUSANDS/UL (ref 149–390)
PMV BLD AUTO: 11.8 FL (ref 8.9–12.7)
POTASSIUM SERPL-SCNC: 3.8 MMOL/L (ref 3.5–5.3)
RBC # BLD AUTO: 3.59 MILLION/UL (ref 3.81–5.12)
SODIUM SERPL-SCNC: 138 MMOL/L (ref 136–145)
WBC # BLD AUTO: 14.96 THOUSAND/UL (ref 4.31–10.16)

## 2018-08-15 PROCEDURE — 80048 BASIC METABOLIC PNL TOTAL CA: CPT | Performed by: INTERNAL MEDICINE

## 2018-08-15 PROCEDURE — 82948 REAGENT STRIP/BLOOD GLUCOSE: CPT

## 2018-08-15 PROCEDURE — 85027 COMPLETE CBC AUTOMATED: CPT | Performed by: INTERNAL MEDICINE

## 2018-08-15 PROCEDURE — 99226 PR SBSQ OBSERVATION CARE/DAY 35 MINUTES: CPT | Performed by: INTERNAL MEDICINE

## 2018-08-15 RX ORDER — BENZONATATE 100 MG/1
100 CAPSULE ORAL 3 TIMES DAILY PRN
Status: DISCONTINUED | OUTPATIENT
Start: 2018-08-15 | End: 2018-08-17 | Stop reason: HOSPADM

## 2018-08-15 RX ORDER — PROMETHAZINE HYDROCHLORIDE 25 MG/1
12.5 TABLET ORAL EVERY 6 HOURS
Status: DISCONTINUED | OUTPATIENT
Start: 2018-08-15 | End: 2018-08-15

## 2018-08-15 RX ORDER — METHYLPREDNISOLONE SODIUM SUCCINATE 40 MG/ML
20 INJECTION, POWDER, LYOPHILIZED, FOR SOLUTION INTRAMUSCULAR; INTRAVENOUS EVERY 12 HOURS SCHEDULED
Status: DISCONTINUED | OUTPATIENT
Start: 2018-08-15 | End: 2018-08-17 | Stop reason: HOSPADM

## 2018-08-15 RX ADMIN — GUAIFENESIN 600 MG: 600 TABLET, EXTENDED RELEASE ORAL at 20:36

## 2018-08-15 RX ADMIN — FLUTICASONE FUROATE AND VILANTEROL TRIFENATATE 1 PUFF: 200; 25 POWDER RESPIRATORY (INHALATION) at 08:28

## 2018-08-15 RX ADMIN — INSULIN LISPRO 2 UNITS: 100 INJECTION, SOLUTION INTRAVENOUS; SUBCUTANEOUS at 17:22

## 2018-08-15 RX ADMIN — ENOXAPARIN SODIUM 40 MG: 40 INJECTION SUBCUTANEOUS at 17:22

## 2018-08-15 RX ADMIN — INSULIN LISPRO 1 UNITS: 100 INJECTION, SOLUTION INTRAVENOUS; SUBCUTANEOUS at 21:08

## 2018-08-15 RX ADMIN — CEFTRIAXONE 1000 MG: 1 INJECTION, POWDER, FOR SOLUTION INTRAMUSCULAR; INTRAVENOUS at 17:23

## 2018-08-15 RX ADMIN — GUAIFENESIN 600 MG: 600 TABLET, EXTENDED RELEASE ORAL at 08:28

## 2018-08-15 RX ADMIN — METHYLPREDNISOLONE SODIUM SUCCINATE 20 MG: 40 INJECTION, POWDER, FOR SOLUTION INTRAMUSCULAR; INTRAVENOUS at 09:57

## 2018-08-15 RX ADMIN — INSULIN LISPRO 1 UNITS: 100 INJECTION, SOLUTION INTRAVENOUS; SUBCUTANEOUS at 12:22

## 2018-08-15 RX ADMIN — AZITHROMYCIN MONOHYDRATE 500 MG: 500 INJECTION, POWDER, LYOPHILIZED, FOR SOLUTION INTRAVENOUS at 14:24

## 2018-08-15 RX ADMIN — PROMETHAZINE HYDROCHLORIDE 12.5 MG: 25 TABLET ORAL at 09:56

## 2018-08-15 RX ADMIN — ENOXAPARIN SODIUM 40 MG: 40 INJECTION SUBCUTANEOUS at 08:28

## 2018-08-15 RX ADMIN — BENZONATATE 100 MG: 100 CAPSULE ORAL at 23:55

## 2018-08-15 RX ADMIN — METHYLPREDNISOLONE SODIUM SUCCINATE 20 MG: 40 INJECTION, POWDER, FOR SOLUTION INTRAMUSCULAR; INTRAVENOUS at 20:36

## 2018-08-15 NOTE — PROGRESS NOTES
Received page from nurse stating that patient did not want to take evening phenergan for cough  Pt told nurse she took tessalon perles at home  Phenergan discontinued and PRN Tessalon perles added

## 2018-08-15 NOTE — PROGRESS NOTES
IM Residency Progress Note   Unit/Bed#: [facility] 902-01 Encounter: [id number]  SOD Team C       Jaimie Raymundo Topping 67 y o  female [id number]    Hospital Stay Days: 0      Assessment/Plan:    Principal Problem:    CAP (community acquired pneumonia)  Active Problems:    Hyperglycemia    Transaminitis    Alkaline phosphatase elevation    SOB (shortness of breath)    Lactic acid acidosis    Community-acquired pneumonia  · WBC count trending down, patient was afebrile  · S pneumo and Legionella urinary antigen negative  · Blood cultures negative at 24 hours  · Continue 1 g IV ceftriaxone daily  · Continue IV Azithromycin 500mg daily  · Tessalon perles PRN for spastic cough     Lactic acidosis - resolved     Shortness of breath likely related to PNA  · BNP WNL, CXR did not show pulmonary edema or effusions, so less likely to be HF-related, especially given lack of cardiac history  · May 2017 echo was normal  · Receiving 2 L oxygen by nasal cannula, patient states it makes her feel better  · Troponin negative  · Wean down O2 to maintain sats > 92%     Hyperglycemia  · A1C 6 5% - this could be in part elevated due to recent steroids but likely has at least prediabetes  · Subcutaneous insulin and sliding scale ordered  · Carb controlled diet  · Will need repeat A1C as an outpatient     Transaminitis, elevated ALP  · AST/ALT 92/127, alp 199, normal bilirubin, normal platelets  · No abdominal pain, nausea/vomiting  · GGT elevated at 191  · No need to pursue abdominal imaging at this time, unless clinical picture changes      Asthma  · Patient has mild asthma which rarely requires medications  · Continue breo-ellipta  · Respiratory protocol  · Managed by Dr Mckeon ([phone])    Disposition: Continue inpatient care with IV antibiotics  Continue to wean down O2 and anticipate patient may be able to be discharged home tomorrow or Friday  Subjective:   Patient reports she is still very fatigued    She has no new concerns or complaints  She notes the breathing feels slightly better today  Overall though, she still feels under the weather  Slight improvement of her cough with Tessalon Perles  Vitals: Temp (24hrs), Av 6 °F (37 °C), Min:97 6 °F (36 4 °C), Max:99 3 °F (37 4 °C)  Current: Temperature: 98 6 °F (37 °C)  Vitals:    18 2043 18 2345 08/15/18 0010 08/15/18 0751   BP:  156/69  130/68   BP Location:  Right arm  Right arm   Pulse:  93  86   Resp:  18  16   Temp:  99 3 °F (37 4 °C)  98 6 °F (37 °C)   TempSrc:  Oral  Oral   SpO2: 92% 90% 95% 90%   Weight:       Height:        Body mass index is 23 78 kg/m²  I/O last 24 hours: In: 36 [P O :920]  Out: -       Physical Exam   Constitutional: She is oriented to person, place, and time  She appears well-developed and well-nourished  No distress  Eyes: EOM are normal    Neck: No tracheal deviation present  Cardiovascular: Normal rate and regular rhythm  No murmur heard  Pulmonary/Chest: Effort normal and breath sounds normal  No stridor  No respiratory distress  She has no wheezes  Abdominal: Soft  Bowel sounds are normal  She exhibits no distension  Musculoskeletal: She exhibits no edema or deformity  Neurological: She is alert and oriented to person, place, and time  Skin: Skin is warm and dry  Psychiatric: She has a normal mood and affect  Her behavior is normal    Nursing note and vitals reviewed          Invasive Devices     Peripheral Intravenous Line            Peripheral IV 18 Left Antecubital 2 days                          Labs:   Recent Results (from the past 24 hour(s))   Fingerstick Glucose (POCT)    Collection Time: 18 11:25 AM   Result Value Ref Range    POC Glucose 137 65 - 140 mg/dl   Fingerstick Glucose (POCT)    Collection Time: 18  6:05 PM   Result Value Ref Range    POC Glucose 137 65 - 140 mg/dl   Fingerstick Glucose (POCT)    Collection Time: 18  9:22 PM   Result Value Ref Range    POC Glucose 161 (H) 65 - 140 mg/dl   Basic metabolic panel    Collection Time: 08/15/18  4:34 AM   Result Value Ref Range    Sodium 138 136 - 145 mmol/L    Potassium 3 8 3 5 - 5 3 mmol/L    Chloride 103 100 - 108 mmol/L    CO2 27 21 - 32 mmol/L    Anion Gap 8 4 - 13 mmol/L    BUN 12 5 - 25 mg/dL    Creatinine 0 64 0 60 - 1 30 mg/dL    Glucose 109 65 - 140 mg/dL    Glucose, Fasting 109 (H) 65 - 99 mg/dL    Calcium 8 8 8 3 - 10 1 mg/dL    eGFR 89 ml/min/1 73sq m   CBC    Collection Time: 08/15/18  4:34 AM   Result Value Ref Range    WBC 14 96 (H) 4 31 - 10 16 Thousand/uL    RBC 3 59 (L) 3 81 - 5 12 Million/uL    Hemoglobin 11 0 (L) 11 5 - 15 4 g/dL    Hematocrit 35 1 34 8 - 46 1 %    MCV 98 82 - 98 fL    MCH 30 6 26 8 - 34 3 pg    MCHC 31 3 (L) 31 4 - 37 4 g/dL    RDW 13 8 11 6 - 15 1 %    Platelets 788 300 - 264 Thousands/uL    MPV 11 8 8 9 - 12 7 fL   Fingerstick Glucose (POCT)    Collection Time: 08/15/18  8:00 AM   Result Value Ref Range    POC Glucose 135 65 - 140 mg/dl       Radiology Results: I have personally reviewed pertinent reports  Other Diagnostic Testing:   I have personally reviewed pertinent reports          Active Meds:   Current Facility-Administered Medications   Medication Dose Route Frequency    acetaminophen (TYLENOL) tablet 650 mg  650 mg Oral Q6H PRN    albuterol inhalation solution 2 5 mg  2 5 mg Nebulization Q4H PRN    azithromycin (ZITHROMAX) 500 mg in sodium chloride 0 9 % 250 mL IVPB  500 mg Intravenous Q24H    benzonatate (TESSALON PERLES) capsule 100 mg  100 mg Oral TID PRN    ceftriaxone (ROCEPHIN) 1 g/50 mL in dextrose IVPB  1,000 mg Intravenous Q24H    enoxaparin (LOVENOX) subcutaneous injection 40 mg  40 mg Subcutaneous BID    fluticasone-vilanterol (BREO ELLIPTA) 200-25 MCG/INH inhaler 1 puff  1 puff Inhalation Daily    guaiFENesin (MUCINEX) 12 hr tablet 600 mg  600 mg Oral Q12H NATALIE    insulin lispro (HumaLOG) 100 units/mL subcutaneous injection 1-5 Units  1-5 Units Subcutaneous TID AC    insulin lispro (HumaLOG) 100 units/mL subcutaneous injection 1-5 Units  1-5 Units Subcutaneous HS         VTE Pharmacologic Prophylaxis: Enoxaparin (Lovenox)  VTE Mechanical Prophylaxis: sequential compression device    Kaelyn Alvarez DO

## 2018-08-16 LAB
ALBUMIN SERPL BCP-MCNC: 2 G/DL (ref 3.5–5)
ALP SERPL-CCNC: 196 U/L (ref 46–116)
ALT SERPL W P-5'-P-CCNC: 129 U/L (ref 12–78)
ANION GAP SERPL CALCULATED.3IONS-SCNC: 8 MMOL/L (ref 4–13)
AST SERPL W P-5'-P-CCNC: 44 U/L (ref 5–45)
BACTERIA SPT RESP CULT: NORMAL
BASOPHILS # BLD MANUAL: 0 THOUSAND/UL (ref 0–0.1)
BASOPHILS NFR MAR MANUAL: 0 % (ref 0–1)
BILIRUB SERPL-MCNC: 0.25 MG/DL (ref 0.2–1)
BUN SERPL-MCNC: 17 MG/DL (ref 5–25)
CALCIUM SERPL-MCNC: 9.1 MG/DL (ref 8.3–10.1)
CHLORIDE SERPL-SCNC: 105 MMOL/L (ref 100–108)
CO2 SERPL-SCNC: 25 MMOL/L (ref 21–32)
CREAT SERPL-MCNC: 0.6 MG/DL (ref 0.6–1.3)
EOSINOPHIL # BLD MANUAL: 0 THOUSAND/UL (ref 0–0.4)
EOSINOPHIL NFR BLD MANUAL: 0 % (ref 0–6)
ERYTHROCYTE [DISTWIDTH] IN BLOOD BY AUTOMATED COUNT: 13.2 % (ref 11.6–15.1)
GFR SERPL CREATININE-BSD FRML MDRD: 91 ML/MIN/1.73SQ M
GLUCOSE SERPL-MCNC: 152 MG/DL (ref 65–140)
GLUCOSE SERPL-MCNC: 165 MG/DL (ref 65–140)
GLUCOSE SERPL-MCNC: 166 MG/DL (ref 65–140)
GLUCOSE SERPL-MCNC: 183 MG/DL (ref 65–140)
GLUCOSE SERPL-MCNC: 225 MG/DL (ref 65–140)
GRAM STN SPEC: NORMAL
HCT VFR BLD AUTO: 34.6 % (ref 34.8–46.1)
HGB BLD-MCNC: 11.1 G/DL (ref 11.5–15.4)
LYMPHOCYTES # BLD AUTO: 0.65 THOUSAND/UL (ref 0.6–4.47)
LYMPHOCYTES # BLD AUTO: 4 % (ref 14–44)
MCH RBC QN AUTO: 30.5 PG (ref 26.8–34.3)
MCHC RBC AUTO-ENTMCNC: 32.1 G/DL (ref 31.4–37.4)
MCV RBC AUTO: 95 FL (ref 82–98)
MONOCYTES # BLD AUTO: 0.49 THOUSAND/UL (ref 0–1.22)
MONOCYTES NFR BLD: 3 % (ref 4–12)
NEUTROPHILS # BLD MANUAL: 14.64 THOUSAND/UL (ref 1.85–7.62)
NEUTS SEG NFR BLD AUTO: 90 % (ref 43–75)
NRBC BLD AUTO-RTO: 0 /100 WBCS
PLATELET # BLD AUTO: 333 THOUSANDS/UL (ref 149–390)
PLATELET BLD QL SMEAR: ADEQUATE
PMV BLD AUTO: 11.3 FL (ref 8.9–12.7)
POIKILOCYTOSIS BLD QL SMEAR: PRESENT
POTASSIUM SERPL-SCNC: 4.1 MMOL/L (ref 3.5–5.3)
PROT SERPL-MCNC: 6.9 G/DL (ref 6.4–8.2)
RBC # BLD AUTO: 3.64 MILLION/UL (ref 3.81–5.12)
RBC MORPH BLD: PRESENT
SODIUM SERPL-SCNC: 138 MMOL/L (ref 136–145)
VARIANT LYMPHS # BLD AUTO: 3 %
WBC # BLD AUTO: 16.27 THOUSAND/UL (ref 4.31–10.16)

## 2018-08-16 PROCEDURE — 99233 SBSQ HOSP IP/OBS HIGH 50: CPT | Performed by: INTERNAL MEDICINE

## 2018-08-16 PROCEDURE — 82948 REAGENT STRIP/BLOOD GLUCOSE: CPT

## 2018-08-16 PROCEDURE — 80053 COMPREHEN METABOLIC PANEL: CPT | Performed by: INTERNAL MEDICINE

## 2018-08-16 PROCEDURE — 85007 BL SMEAR W/DIFF WBC COUNT: CPT | Performed by: INTERNAL MEDICINE

## 2018-08-16 PROCEDURE — 85027 COMPLETE CBC AUTOMATED: CPT | Performed by: INTERNAL MEDICINE

## 2018-08-16 RX ADMIN — GUAIFENESIN 600 MG: 600 TABLET, EXTENDED RELEASE ORAL at 20:29

## 2018-08-16 RX ADMIN — BENZONATATE 100 MG: 100 CAPSULE ORAL at 20:30

## 2018-08-16 RX ADMIN — INSULIN LISPRO 1 UNITS: 100 INJECTION, SOLUTION INTRAVENOUS; SUBCUTANEOUS at 08:02

## 2018-08-16 RX ADMIN — ENOXAPARIN SODIUM 40 MG: 40 INJECTION SUBCUTANEOUS at 08:02

## 2018-08-16 RX ADMIN — CEFTRIAXONE 1000 MG: 1 INJECTION, POWDER, FOR SOLUTION INTRAMUSCULAR; INTRAVENOUS at 17:30

## 2018-08-16 RX ADMIN — METHYLPREDNISOLONE SODIUM SUCCINATE 20 MG: 40 INJECTION, POWDER, FOR SOLUTION INTRAMUSCULAR; INTRAVENOUS at 08:02

## 2018-08-16 RX ADMIN — FLUTICASONE FUROATE AND VILANTEROL TRIFENATATE 1 PUFF: 200; 25 POWDER RESPIRATORY (INHALATION) at 08:02

## 2018-08-16 RX ADMIN — GUAIFENESIN 600 MG: 600 TABLET, EXTENDED RELEASE ORAL at 08:02

## 2018-08-16 RX ADMIN — ENOXAPARIN SODIUM 40 MG: 40 INJECTION SUBCUTANEOUS at 17:30

## 2018-08-16 RX ADMIN — AZITHROMYCIN MONOHYDRATE 500 MG: 500 INJECTION, POWDER, LYOPHILIZED, FOR SOLUTION INTRAVENOUS at 14:51

## 2018-08-16 RX ADMIN — INSULIN LISPRO 2 UNITS: 100 INJECTION, SOLUTION INTRAVENOUS; SUBCUTANEOUS at 12:12

## 2018-08-16 RX ADMIN — METHYLPREDNISOLONE SODIUM SUCCINATE 20 MG: 40 INJECTION, POWDER, FOR SOLUTION INTRAMUSCULAR; INTRAVENOUS at 20:27

## 2018-08-16 RX ADMIN — INSULIN LISPRO 1 UNITS: 100 INJECTION, SOLUTION INTRAVENOUS; SUBCUTANEOUS at 17:29

## 2018-08-16 RX ADMIN — INSULIN LISPRO 1 UNITS: 100 INJECTION, SOLUTION INTRAVENOUS; SUBCUTANEOUS at 21:43

## 2018-08-16 NOTE — PHYSICIAN ADVISOR
Current patient class: Observation  The patient is currently on Hospital Day: 3 at [address] [place] [address]      The patient was admitted to the hospital at N/A on N/A for the following diagnosis:  Tachypnea [R06 82]  Leukocytosis [D72 829]  SOB (shortness of breath) [R06 02]  Hypoxia [R09 02]  Right lower lobe pneumonia (HCC) [J18 1]       There is documentation in the medical record of an expected length of stay of at least 2 midnights  The patient is therefore expected to satisfy the 2 midnight benchmark and given the 2 midnight presumption is appropriate for INPATIENT ADMISSION  Given this expectation of a satisfying stay, CMS instructs us that the patient is most often appropriate for inpatient admission under part A provided medical necessity is documented in the chart  After review of the relevant documentation, labs, vital signs and test results, the patient is appropriate for INPATIENT ADMISSION  Admission to the hospital as an inpatient is a complex decision making process which requires the practitioner to consider the patients presenting complaint, history and physical examination and all relevant testing  With this in mind, in this case, the patient was deemed appropriate for INPATIENT ADMISSION  After review of the documentation and testing available at the time of the admission I concur with this clinical determination of medical necessity  Rationale is as follows: The patient is a 67 yrs old Female who presented to the ED at 8/13/2018  4:03 PM with a chief complaint of Shortness of Breath (Pt states "my allergist sent me in here because I have bronchitis and pneumonia " Pt c/o SOB with a cough with "thick yellow phlegm" and being "totally exhausted  ")     Pt is still symptomatic and has had IV steroids added to the regimen      Given the need for further hospitalization, and along with the documentation of medical necessity present in the chart, the patient is appropriate for inpatient admission  The patient is expected to satisfy the 2 midnight benchmark, and will require further acute medical care  The patient does have comorbid conditions which increases the risk for significant adverse outcome  Given this the patient is appropriate for inpatient admission        The patients vitals on arrival were ED Triage Vitals [08/13/18 1452]   Temperature Pulse Respirations Blood Pressure SpO2   98 8 °F (37 1 °C) 105 (!) 24 113/63 91 %      Temp Source Heart Rate Source Patient Position - Orthostatic VS BP Location FiO2 (%)   Oral Monitor Sitting Left arm --      Pain Score       No Pain           Past Medical History:   Diagnosis Date    Allergic     environmental    Asthma     Pneumonia      Past Surgical History:   Procedure Laterality Date    BACK SURGERY      BREAST SURGERY      right biopsy    FOOT SURGERY      KNEE ARTHROSCOPY      TONSILLECTOMY             Consults have been placed to:   IP CONSULT TO CASE MANAGEMENT    Vitals:    08/15/18 0751 08/15/18 1525 08/15/18 1532 08/15/18 1929   BP: 130/68  115/64    BP Location: Right arm  Left arm    Pulse: 86  81    Resp: 16  17    Temp: 98 6 °F (37 °C)  98 3 °F (36 8 °C)    TempSrc: Oral  Oral    SpO2: 90% 95% 97% 93%   Weight:       Height:           Most recent labs:    Recent Labs      08/13/18   1502  08/14/18   0439  08/15/18   0434   WBC  23 25*  17 90*  14 96*   HGB  11 7  10 7*  11 0*   HCT  36 0  33 6*  35 1   PLT  309  288  273   K  3 9  4 2  3 8   NA  135*  140  138   CALCIUM  9 9  8 6  8 8   BUN  16  14  12   CREATININE  0 94  0 66  0 64   TROPONINI  <0 02   --    --    AST  92*  50*   --    ALT  127*  101*   --    ALKPHOS  199*  166*   --    BILITOT  0 51  0 19*   --        Scheduled Meds:  Current Facility-Administered Medications:  acetaminophen 650 mg Oral Q6H PRN Kanwal Weaver MD    albuterol 2 5 mg Nebulization Q4H PRN Jodi Cohen MD    azithromycin 500 mg Intravenous Q24H Kevin Hernandez DO Last Rate: Stopped (08/15/18 6874)   benzonatate 100 mg Oral TID PRN Hiwot Cherry DO    cefTRIAXone 1,000 mg Intravenous Q24H Kanwal Weaver MD Last Rate: Stopped (08/15/18 9008)   enoxaparin 40 mg Subcutaneous BID Kanwal Weaver MD    fluticasone-vilanterol 1 puff Inhalation Daily Dulce Maria Monahan DO    guaiFENesin 600 mg Oral Q12H Lili Ireland MD    insulin lispro 1-5 Units Subcutaneous TID AC Kanwal Weaver MD    insulin lispro 1-5 Units Subcutaneous HS Kanwal Weaver MD    methylPREDNISolone sodium succinate 20 mg Intravenous Q12H [address] Efrem York DO      Continuous Infusions:   PRN Meds:   acetaminophen    albuterol    benzonatate    Surgical procedures (if appropriate):

## 2018-08-16 NOTE — CASE MANAGEMENT
Thank you,  Shasta Aqq  [facility] Utilization Review Department  Phone: [phone]; Fax [phone]  ATTENTION: The Network Utilization Review Department is now centralized for our 9 Facilities  Make a note that we have a new phone and fax numbers for our Department  Please call with any questions or concerns to [phone] and carefully follow the prompts so that you are directed to the right person  All voicemails are confidential  Fax any determinations, approvals, denials, and requests for initial or continue stay review clinical to [phone]  Due to HIGH CALL volume, it would be easier if you could please send faxed requests to expedite your requests and in part, help us provide discharge notifications faster  SENDING PATIENT CLASS UPDATE-SEE INITIAL REVIEW FAXED BY JORY FOR CLINCAL INFORMATION      WAS OBSERVATION 8/13/18 @1643 CONVERTED TO INPATIENT ADMISSION 6/15/18 @2045 DUE TO CARE REQUIRED FOR SUSPECTED MULTIFOCAL PNEUMONIA WITH IV ANTIBIOTICS AND IV STEROIDS NEEDED     08/15/18 2046  Inpatient Admission Once     Transfer Service: General Medicine       Question Answer Comment   Admitting Physician [facility]    Level of Care Med Surg    Estimated length of stay More than 2 Midnights    Certification I certify that inpatient services are medically necessary for this patient for a duration of greater than two midnights  See H&P and MD Progress Notes for additional information about the patient's course of treatment          08/15/18 2045

## 2018-08-16 NOTE — PROGRESS NOTES
IM Residency Progress Note   Unit/Bed#: [facility] 902-01 Encounter: [id number]  SOD Team C       Myron Avitia Topping 67 y o  female [phone]    Hospital Stay Days: 1      Assessment/Plan:    Principal Problem:    CAP (community acquired pneumonia)  Active Problems:    Hyperglycemia    Transaminitis    Alkaline phosphatase elevation    SOB (shortness of breath)    Lactic acid acidosis    Community-acquired pneumonia  · Slight increase in WBC count but patient was afebrile overnight  · S pneumo and Legionella urinary antigen negative  · Blood cultures negative at 24 hours  · Continue 1 g IV ceftriaxone daily  · Continue IV Azithromycin 500mg daily  · Tessalon perles PRN for spastic cough  · Added solumedrol 20mg BID yesterday     Lactic acidosis - resolved     Acute hypoxic respiratory failure related to PNA  · BNP WNL, CXR did not show pulmonary edema or effusions, so less likely to be HF-related, especially given lack of cardiac history  · May 2017 echo was normal  · Receiving 2 L oxygen by nasal cannula, patient states it makes her feel better  · Troponin negative  · Wean down O2 to maintain sats > 92%     Hyperglycemia  · A1C 6 5% - this could be in part elevated due to recent steroids but likely has at least prediabetes  · Subcutaneous insulin and sliding scale ordered  · Carb controlled diet  · Will need repeat A1C as an outpatient     Transaminitis, elevated ALP  · AST/ALT 92/127, alp 199, normal bilirubin, normal platelets  · No abdominal pain, nausea/vomiting  · GGT elevated at 191  · No need to pursue abdominal imaging at this time, unless clinical picture changes      Asthma  · Patient has mild asthma which rarely requires medications  · Continue breo-ellipta  · Respiratory protocol  · Managed by Dr Mckeon ([phone])    Disposition:  Continue inpatient care  Will reassess in the afternoon  Subjective:   Patient reports is too early morning to tell how she feels today    She still looks fairly uncomfortable with slightly labored breathing  She denies any fevers or chills overnight  Her appetite has been normal for her  No nausea or vomiting  Tessalon Perles have helped with cough  Vitals: Temp (24hrs), Av 1 °F (36 7 °C), Min:97 9 °F (36 6 °C), Max:98 3 °F (36 8 °C)  Current: Temperature: 97 9 °F (36 6 °C)  Vitals:    08/15/18 1532 08/15/18 1929 08/15/18 2335 18 0722   BP: 115/64  140/67 130/64   BP Location: Left arm  Right arm    Pulse: 81  83 72   Resp: 17  17 18   Temp: 98 3 °F (36 8 °C)  98 1 °F (36 7 °C) 97 9 °F (36 6 °C)   TempSrc: Oral  Oral    SpO2: 97% 93% 93% 95%   Weight:       Height:        Body mass index is 23 78 kg/m²  I/O last 24 hours: In: 2100 [P O :1800; IV Piggyback:300]  Out: -       Physical Exam   Constitutional: She is oriented to person, place, and time  She appears well-developed and well-nourished  No distress  HENT:   Head: Normocephalic and atraumatic  Eyes: EOM are normal    Neck: No tracheal deviation present  Cardiovascular: Normal rate and regular rhythm  No murmur heard  Pulmonary/Chest: Effort normal and breath sounds normal  No stridor  Slightly rhonchorous breath sounds   Abdominal: Soft  She exhibits no distension  There is no tenderness  Musculoskeletal: She exhibits no edema or deformity  Neurological: She is alert and oriented to person, place, and time  Skin: Skin is warm and dry  No rash noted  No erythema  Psychiatric: She has a normal mood and affect  Her behavior is normal    Nursing note and vitals reviewed          Invasive Devices     Peripheral Intravenous Line            Peripheral IV 08/15/18 Left Forearm less than 1 day                          Labs:   Recent Results (from the past 24 hour(s))   Fingerstick Glucose (POCT)    Collection Time: 08/15/18 12:01 PM   Result Value Ref Range    POC Glucose 167 (H) 65 - 140 mg/dl   Fingerstick Glucose (POCT)    Collection Time: 08/15/18  5:12 PM   Result Value Ref Range    POC Glucose 250 (H) 65 - 140 mg/dl   Fingerstick Glucose (POCT)    Collection Time: 08/15/18  9:04 PM   Result Value Ref Range    POC Glucose 184 (H) 65 - 140 mg/dl   CBC and differential    Collection Time: 08/16/18  4:34 AM   Result Value Ref Range    WBC 16 27 (H) 4 31 - 10 16 Thousand/uL    RBC 3 64 (L) 3 81 - 5 12 Million/uL    Hemoglobin 11 1 (L) 11 5 - 15 4 g/dL    Hematocrit 34 6 (L) 34 8 - 46 1 %    MCV 95 82 - 98 fL    MCH 30 5 26 8 - 34 3 pg    MCHC 32 1 31 4 - 37 4 g/dL    RDW 13 2 11 6 - 15 1 %    MPV 11 3 8 9 - 12 7 fL    Platelets 100 177 - 901 Thousands/uL    nRBC 0 /100 WBCs   Comprehensive metabolic panel    Collection Time: 08/16/18  4:34 AM   Result Value Ref Range    Sodium 138 136 - 145 mmol/L    Potassium 4 1 3 5 - 5 3 mmol/L    Chloride 105 100 - 108 mmol/L    CO2 25 21 - 32 mmol/L    Anion Gap 8 4 - 13 mmol/L    BUN 17 5 - 25 mg/dL    Creatinine 0 60 0 60 - 1 30 mg/dL    Glucose 183 (H) 65 - 140 mg/dL    Calcium 9 1 8 3 - 10 1 mg/dL    AST 44 5 - 45 U/L     (H) 12 - 78 U/L    Alkaline Phosphatase 196 (H) 46 - 116 U/L    Total Protein 6 9 6 4 - 8 2 g/dL    Albumin 2 0 (L) 3 5 - 5 0 g/dL    Total Bilirubin 0 25 0 20 - 1 00 mg/dL    eGFR 91 ml/min/1 73sq m   Manual Differential(PHLEBS Do Not Order)    Collection Time: 08/16/18  4:34 AM   Result Value Ref Range    Segmented % 90 (H) 43 - 75 %    Lymphocytes % 4 (L) 14 - 44 %    Monocytes % 3 (L) 4 - 12 %    Eosinophils % 0 0 - 6 %    Basophils % 0 0 - 1 %    Atypical Lymphocytes % 3 (H) <=0 %    Absolute Neutrophils 14 64 (H) 1 85 - 7 62 Thousand/uL    Lymphocytes Absolute 0 65 0 60 - 4 47 Thousand/uL    Monocytes Absolute 0 49 0 00 - 1 22 Thousand/uL    Eosinophils Absolute 0 00 0 00 - 0 40 Thousand/uL    Basophils Absolute 0 00 0 00 - 0 10 Thousand/uL    Total Counted      RBC Morphology Present     Poikilocytes Present     Platelet Estimate Adequate Adequate   Fingerstick Glucose (POCT)    Collection Time: 08/16/18  7:24 AM Result Value Ref Range    POC Glucose 152 (H) 65 - 140 mg/dl       Radiology Results: I have personally reviewed pertinent reports  Other Diagnostic Testing:   I have personally reviewed pertinent reports          Active Meds:   Current Facility-Administered Medications   Medication Dose Route Frequency    acetaminophen (TYLENOL) tablet 650 mg  650 mg Oral Q6H PRN    albuterol inhalation solution 2 5 mg  2 5 mg Nebulization Q4H PRN    azithromycin (ZITHROMAX) 500 mg in sodium chloride 0 9 % 250 mL IVPB  500 mg Intravenous Q24H    benzonatate (TESSALON PERLES) capsule 100 mg  100 mg Oral TID PRN    ceftriaxone (ROCEPHIN) 1 g/50 mL in dextrose IVPB  1,000 mg Intravenous Q24H    enoxaparin (LOVENOX) subcutaneous injection 40 mg  40 mg Subcutaneous BID    fluticasone-vilanterol (BREO ELLIPTA) 200-25 MCG/INH inhaler 1 puff  1 puff Inhalation Daily    guaiFENesin (MUCINEX) 12 hr tablet 600 mg  600 mg Oral Q12H [address]    insulin lispro (HumaLOG) 100 units/mL subcutaneous injection 1-5 Units  1-5 Units Subcutaneous TID AC    insulin lispro (HumaLOG) 100 units/mL subcutaneous injection 1-5 Units  1-5 Units Subcutaneous HS    methylPREDNISolone sodium succinate (Solu-MEDROL) injection 20 mg  20 mg Intravenous Q12H [address]         VTE Pharmacologic Prophylaxis: Enoxaparin (Lovenox)  VTE Mechanical Prophylaxis: sequential compression device    Tyler Flannery DO

## 2018-08-17 VITALS
BODY MASS INDEX: 23.92 KG/M2 | HEIGHT: 62 IN | OXYGEN SATURATION: 89 % | WEIGHT: 130 LBS | DIASTOLIC BLOOD PRESSURE: 60 MMHG | SYSTOLIC BLOOD PRESSURE: 118 MMHG | TEMPERATURE: 97.7 F | HEART RATE: 72 BPM | RESPIRATION RATE: 18 BRPM

## 2018-08-17 LAB
ERYTHROCYTE [DISTWIDTH] IN BLOOD BY AUTOMATED COUNT: 13.4 % (ref 11.6–15.1)
GLUCOSE SERPL-MCNC: 118 MG/DL (ref 65–140)
GLUCOSE SERPL-MCNC: 186 MG/DL (ref 65–140)
HCT VFR BLD AUTO: 33.2 % (ref 34.8–46.1)
HGB BLD-MCNC: 10.8 G/DL (ref 11.5–15.4)
MCH RBC QN AUTO: 30.6 PG (ref 26.8–34.3)
MCHC RBC AUTO-ENTMCNC: 32.5 G/DL (ref 31.4–37.4)
MCV RBC AUTO: 94 FL (ref 82–98)
PLATELET # BLD AUTO: 379 THOUSANDS/UL (ref 149–390)
PMV BLD AUTO: 11.1 FL (ref 8.9–12.7)
PROCALCITONIN SERPL-MCNC: 0.07 NG/ML
RBC # BLD AUTO: 3.53 MILLION/UL (ref 3.81–5.12)
WBC # BLD AUTO: 15.51 THOUSAND/UL (ref 4.31–10.16)

## 2018-08-17 PROCEDURE — 85027 COMPLETE CBC AUTOMATED: CPT | Performed by: INTERNAL MEDICINE

## 2018-08-17 PROCEDURE — 82948 REAGENT STRIP/BLOOD GLUCOSE: CPT

## 2018-08-17 PROCEDURE — 84145 PROCALCITONIN (PCT): CPT | Performed by: INTERNAL MEDICINE

## 2018-08-17 PROCEDURE — 99239 HOSP IP/OBS DSCHRG MGMT >30: CPT | Performed by: INTERNAL MEDICINE

## 2018-08-17 RX ORDER — GUAIFENESIN 600 MG
600 TABLET, EXTENDED RELEASE 12 HR ORAL EVERY 12 HOURS SCHEDULED
Qty: 8 TABLET | Refills: 0 | Status: SHIPPED | OUTPATIENT
Start: 2018-08-17 | End: 2018-08-21

## 2018-08-17 RX ORDER — PREDNISONE 20 MG/1
40 TABLET ORAL DAILY
Qty: 10 TABLET | Refills: 0 | Status: SHIPPED | OUTPATIENT
Start: 2018-08-17 | End: 2021-11-19 | Stop reason: HOSPADM

## 2018-08-17 RX ORDER — CEFUROXIME AXETIL 500 MG/1
500 TABLET ORAL EVERY 12 HOURS SCHEDULED
Qty: 14 TABLET | Refills: 0 | Status: SHIPPED | OUTPATIENT
Start: 2018-08-17 | End: 2018-08-24

## 2018-08-17 RX ORDER — BENZONATATE 100 MG/1
100 CAPSULE ORAL 3 TIMES DAILY PRN
Qty: 30 CAPSULE | Refills: 0 | Status: SHIPPED | OUTPATIENT
Start: 2018-08-17 | End: 2021-11-19 | Stop reason: HOSPADM

## 2018-08-17 RX ADMIN — METHYLPREDNISOLONE SODIUM SUCCINATE 20 MG: 40 INJECTION, POWDER, FOR SOLUTION INTRAMUSCULAR; INTRAVENOUS at 08:06

## 2018-08-17 RX ADMIN — GUAIFENESIN 600 MG: 600 TABLET, EXTENDED RELEASE ORAL at 08:07

## 2018-08-17 RX ADMIN — BENZONATATE 100 MG: 100 CAPSULE ORAL at 04:48

## 2018-08-17 RX ADMIN — FLUTICASONE FUROATE AND VILANTEROL TRIFENATATE 1 PUFF: 200; 25 POWDER RESPIRATORY (INHALATION) at 08:14

## 2018-08-17 RX ADMIN — INSULIN LISPRO 1 UNITS: 100 INJECTION, SOLUTION INTRAVENOUS; SUBCUTANEOUS at 12:00

## 2018-08-17 RX ADMIN — ENOXAPARIN SODIUM 40 MG: 40 INJECTION SUBCUTANEOUS at 08:07

## 2018-08-17 NOTE — DISCHARGE SUMMARY
[facility] Discharge Summary - [facility] Gustavo Estevez 67 y o  female MRN: [id number]    [address] [facility] 9 Room / Bed: [facility] 902/[facility] 902-01 Encounter: [id number]    BRIEF OVERVIEW    Admitting Provider: Mayra Gibson MD  Discharge Provider: Mayra Gibson MD  Primary Care Physician at Discharge: ALEXANDRO Dowell 178  Admission Date: 8/13/2018     Discharge Date: 8/17/18    Hospital Course  This is a 66-year-old female with past medical history significant for environmental allergies and asthma who presented on 08/13/2018 with complaint of fatigue, shortness of breath and chills  Patient had been feeling unwell approximately 1 week prior with symptoms of sore throat and head congestion which developed into productive cough with yellow sputum, shortness of breath and fatigue  She saw her outpatient allergist who prescribed Advair b i d , Ventolin Q 4, prednisone taper and azithromycin antibiotic course  Patient had no relief of her symptoms so came to the ED for further evaluation  Patient had bilateral basilar infiltrates on chest x-ray so was started on ceftriaxone and later was added on azithromycin  Strep pneumo and Legionella urinary antigens were negative  Patient continued to have rhonchorous breath sounds so she was started on IV Solu-Medrol 20 mg Q 12  Initially she was treated with 2 L nasal cannula and was able to be weaned off onto room air  Patient was noted to be stable for discharge on 08/17/2018  She was discharged home was 7 day course of Ceftin, 5 day course of prednisone 40 mg, Tessalon Perles p r n , Mucinex p r n  and instructions to use her Advair inhaler daily for the next month  Sepsis secondary to community-acquired pneumonia - presented with tachycardia, leukocytosis and lactic acidosis    Currently resolved with antibiotics      Community-acquired pneumonia  · Bilateral infiltrates on chest x-ray  · S pneumo and Legionella urinary antigen negative  · Blood cultures negative   · Completed 3 days of IV azithromycin  · Stop IV ceftriaxone and start p  o  Ceftin for 7 days  · Tessalon perles PRN for spastic cough  · Prednisone 40 mg p o  for 5 days     Lactic acidosis - resolved     Acute hypoxic respiratory failure related to PNA - resolved, off oxygen     Hyperglycemia  · A1C 6 5%  - no previous diagnosis of diabetes mellitus  · Encourage patient to avoid sweets and simple carbohydrates  · Will need repeat A1C as an outpatient     Transaminitis, elevated ALP  · AST/ALT 92/127, alp 199, normal bilirubin, normal platelets  · No abdominal pain, nausea/vomiting  · GGT elevated at 191  · No need to pursue abdominal imaging at this time, unless clinical picture changes      Asthma  · Patient has mild asthma which rarely requires medications  · Resume home Advair for 1 month  · Follow up with PCP as needed or in previously scheduled appointment in 1 month    Physical Exam   Constitutional: She is oriented to person, place, and time  She appears well-developed and well-nourished  No distress  HENT:   Head: Normocephalic and atraumatic  Eyes: EOM are normal    Neck: No tracheal deviation present  Cardiovascular: Normal rate and regular rhythm  No murmur heard  Pulmonary/Chest: Effort normal and breath sounds normal  No stridor  No respiratory distress  She has no wheezes  Abdominal: Soft  Bowel sounds are normal  She exhibits no distension  Musculoskeletal: She exhibits no edema or deformity  Neurological: She is alert and oriented to person, place, and time  Skin: Skin is warm and dry  Psychiatric: She has a normal mood and affect  Her behavior is normal    Nursing note and vitals reviewed        Presenting Problem/History of Present Illness  Principal Problem:    CAP (community acquired pneumonia)  Active Problems:    Hyperglycemia    Transaminitis    Alkaline phosphatase elevation    SOB (shortness of breath) Lactic acid acidosis  Resolved Problems:    * No resolved hospital problems  *        Diagnostic Procedures Performed  Imaging Studies:  X-ray Chest 2 Views  Result Date: 8/13/2018  Impression: Bibasilar airspace opacities are worsened, concerning for multifocal pneumonia  Pertinent Labs:      Results from last 7 days  Lab Units 08/16/18  0434 08/15/18  0434 08/14/18  0439 08/13/18  1502   SODIUM mmol/L 138 138 140 135*   POTASSIUM mmol/L 4 1 3 8 4 2 3 9   CHLORIDE mmol/L 105 103 106 100   CO2 mmol/L 25 27 28 27   BUN mg/dL 17 12 14 16   CREATININE mg/dL 0 60 0 64 0 66 0 94   CALCIUM mg/dL 9 1 8 8 8 6 9 9   TOTAL PROTEIN g/dL 6 9  --  6 6 7 9   BILIRUBIN TOTAL mg/dL 0 25  --  0 19* 0 51   ALK PHOS U/L 196*  --  166* 199*   ALT U/L 129*  --  101* 127*   AST U/L 44  --  50* 92*   GLUCOSE RANDOM mg/dL 183* 109 121 239*         Therapeutic Procedures Performed  none    Test Results Pending at Discharge: none    Medications     Medication List to be Continued at Discharge  Current Discharge Medication List      CONTINUE these medications which have NOT CHANGED    Details   fluticasone-salmeterol (ADVAIR) 250-50 mcg/dose inhaler Inhale 1 puff 2 (two) times a day Rinse mouth after use             Current Discharge Medication List      START taking these medications    Details   benzonatate (TESSALON PERLES) 100 mg capsule Take 1 capsule (100 mg total) by mouth 3 (three) times a day as needed for cough  Qty: 30 capsule, Refills: 0    Associated Diagnoses: Community acquired pneumonia, unspecified laterality      cefuroxime (CEFTIN) 500 mg tablet Take 1 tablet (500 mg total) by mouth every 12 (twelve) hours for 7 days  Qty: 14 tablet, Refills: 0    Associated Diagnoses: Community acquired pneumonia, unspecified laterality      guaiFENesin (MUCINEX) 600 mg 12 hr tablet Take 1 tablet (600 mg total) by mouth every 12 (twelve) hours for 4 days  Qty: 8 tablet, Refills: 0    Associated Diagnoses: Community acquired pneumonia, unspecified laterality      predniSONE 20 mg tablet Take 2 tablets (40 mg total) by mouth daily  Qty: 10 tablet, Refills: 0    Associated Diagnoses: Community acquired pneumonia, unspecified laterality             Allergies  Allergies   Allergen Reactions    No Active Allergies      Discharge Diet: diabetic diet  Activity restrictions: none  Discharge Condition: stable  Discharged With Lines: no    Discharge Disposition: Discharge home    Outpatient Follow-Up  Follow up with PCP      Code Status: Level 1 - Full Code    Discharge  Statement   I spent 30 minutes minutes discharging the patient  This time was spent on the day of discharge  I had direct contact with the patient on the day of discharge  Additional documentation is required if more than 30 minutes were spent on discharge

## 2018-08-18 LAB
BACTERIA BLD CULT: NORMAL
BACTERIA BLD CULT: NORMAL

## 2018-10-03 ENCOUNTER — APPOINTMENT (OUTPATIENT)
Dept: RADIOLOGY | Age: 73
End: 2018-10-03
Payer: COMMERCIAL

## 2018-10-03 ENCOUNTER — TRANSCRIBE ORDERS (OUTPATIENT)
Dept: ADMINISTRATIVE | Age: 73
End: 2018-10-03

## 2018-10-03 DIAGNOSIS — J18.9 PNEUMONIA DUE TO INFECTIOUS ORGANISM, UNSPECIFIED LATERALITY, UNSPECIFIED PART OF LUNG: ICD-10-CM

## 2018-10-03 DIAGNOSIS — J18.9 PNEUMONIA DUE TO INFECTIOUS ORGANISM, UNSPECIFIED LATERALITY, UNSPECIFIED PART OF LUNG: Primary | ICD-10-CM

## 2018-10-03 PROCEDURE — 71046 X-RAY EXAM CHEST 2 VIEWS: CPT

## 2018-11-28 DIAGNOSIS — Z01.419 ENCOUNTER FOR GYNECOLOGICAL EXAMINATION WITHOUT ABNORMAL FINDING: ICD-10-CM

## 2018-12-03 ENCOUNTER — HOSPITAL ENCOUNTER (OUTPATIENT)
Dept: RADIOLOGY | Facility: HOSPITAL | Age: 73
Discharge: HOME | End: 2018-12-03
Attending: OBSTETRICS & GYNECOLOGY
Payer: COMMERCIAL

## 2018-12-03 VITALS — WEIGHT: 130 LBS | BODY MASS INDEX: 23.92 KG/M2 | HEIGHT: 62 IN

## 2018-12-03 DIAGNOSIS — Z01.419 ENCOUNTER FOR GYNECOLOGICAL EXAMINATION WITHOUT ABNORMAL FINDING: ICD-10-CM

## 2018-12-03 PROCEDURE — 77067 SCR MAMMO BI INCL CAD: CPT

## 2019-11-11 ENCOUNTER — TELEPHONE (OUTPATIENT)
Dept: OBGYN CLINIC | Facility: CLINIC | Age: 74
End: 2019-11-11

## 2019-11-11 DIAGNOSIS — Z12.31 VISIT FOR SCREENING MAMMOGRAM: Primary | ICD-10-CM

## 2019-11-11 NOTE — TELEPHONE ENCOUNTER
Patient needs a script for her annual mammogram  Patient is on medicare and does not need to come in this year

## 2019-11-12 ENCOUNTER — TELEPHONE (OUTPATIENT)
Dept: OBGYN CLINIC | Facility: CLINIC | Age: 74
End: 2019-11-12

## 2019-11-12 DIAGNOSIS — Z12.31 SCREENING MAMMOGRAM, ENCOUNTER FOR: Primary | ICD-10-CM

## 2019-11-12 NOTE — TELEPHONE ENCOUNTER
Patient states she has a letter that she has dense tissue in her breasts  She would like a 3D mammo  Ok to order?

## 2019-11-12 NOTE — TELEPHONE ENCOUNTER
R87 is prov,   Pt has questions regarding a mammo because of dense breasts,  pls call & advise,  thanks

## 2019-11-13 NOTE — TELEPHONE ENCOUNTER
Spoke with Pt today via phone call  Pt informed that radiology order for bilateral screening mammogram w 3d & cad was mailed to Pt's mailing address per Dr Sharmaine Escalera directive

## 2020-01-07 ENCOUNTER — TRANSCRIBE ORDERS (OUTPATIENT)
Dept: LAB | Facility: CLINIC | Age: 75
End: 2020-01-07

## 2020-01-10 ENCOUNTER — HOSPITAL ENCOUNTER (OUTPATIENT)
Dept: RADIOLOGY | Age: 75
Discharge: HOME | End: 2020-01-10
Payer: COMMERCIAL

## 2020-01-10 VITALS — WEIGHT: 130 LBS | BODY MASS INDEX: 23.92 KG/M2 | HEIGHT: 62 IN

## 2020-01-10 DIAGNOSIS — Z12.31 SCREENING MAMMOGRAM, ENCOUNTER FOR: ICD-10-CM

## 2020-01-10 PROCEDURE — 77067 SCR MAMMO BI INCL CAD: CPT

## 2020-01-10 PROCEDURE — 77063 BREAST TOMOSYNTHESIS BI: CPT

## 2021-01-19 ENCOUNTER — IMMUNIZATIONS (OUTPATIENT)
Dept: FAMILY MEDICINE CLINIC | Facility: HOSPITAL | Age: 76
End: 2021-01-19

## 2021-01-19 DIAGNOSIS — Z23 ENCOUNTER FOR IMMUNIZATION: Primary | ICD-10-CM

## 2021-01-19 PROCEDURE — 91300 SARS-COV-2 / COVID-19 MRNA VACCINE (PFIZER-BIONTECH) 30 MCG: CPT

## 2021-01-19 PROCEDURE — 0001A SARS-COV-2 / COVID-19 MRNA VACCINE (PFIZER-BIONTECH) 30 MCG: CPT

## 2021-02-10 ENCOUNTER — IMMUNIZATIONS (OUTPATIENT)
Dept: FAMILY MEDICINE CLINIC | Facility: HOSPITAL | Age: 76
End: 2021-02-10

## 2021-02-10 DIAGNOSIS — Z23 ENCOUNTER FOR IMMUNIZATION: Primary | ICD-10-CM

## 2021-02-10 PROCEDURE — 0002A SARS-COV-2 / COVID-19 MRNA VACCINE (PFIZER-BIONTECH) 30 MCG: CPT

## 2021-02-10 PROCEDURE — 91300 SARS-COV-2 / COVID-19 MRNA VACCINE (PFIZER-BIONTECH) 30 MCG: CPT

## 2021-02-24 ENCOUNTER — OFFICE VISIT (OUTPATIENT)
Dept: OBGYN CLINIC | Facility: CLINIC | Age: 76
End: 2021-02-24
Payer: COMMERCIAL

## 2021-02-24 VITALS
SYSTOLIC BLOOD PRESSURE: 108 MMHG | HEIGHT: 61 IN | BODY MASS INDEX: 25.53 KG/M2 | DIASTOLIC BLOOD PRESSURE: 64 MMHG | WEIGHT: 135.2 LBS

## 2021-02-24 DIAGNOSIS — Z12.31 SCREENING MAMMOGRAM, ENCOUNTER FOR: ICD-10-CM

## 2021-02-24 DIAGNOSIS — Z78.0 POSTMENOPAUSAL STATUS, AGE-RELATED: ICD-10-CM

## 2021-02-24 DIAGNOSIS — N30.90 CYSTITIS: Primary | ICD-10-CM

## 2021-02-24 DIAGNOSIS — Z13.820 ENCOUNTER FOR SCREENING FOR OSTEOPOROSIS: ICD-10-CM

## 2021-02-24 DIAGNOSIS — Z01.419 ENCOUNTER FOR GYNECOLOGICAL EXAMINATION (GENERAL) (ROUTINE) WITHOUT ABNORMAL FINDINGS: ICD-10-CM

## 2021-02-24 DIAGNOSIS — Z12.31 VISIT FOR SCREENING MAMMOGRAM: ICD-10-CM

## 2021-02-24 PROCEDURE — G0101 CA SCREEN;PELVIC/BREAST EXAM: HCPCS | Performed by: OBSTETRICS & GYNECOLOGY

## 2021-02-24 PROCEDURE — G0145 SCR C/V CYTO,THINLAYER,RESCR: HCPCS | Performed by: OBSTETRICS & GYNECOLOGY

## 2021-02-24 RX ORDER — OMEGA-3S/DHA/EPA/FISH OIL/D3 300MG-1000
400 CAPSULE ORAL DAILY
COMMUNITY

## 2021-02-24 RX ORDER — IBUPROFEN 200 MG
1 CAPSULE ORAL DAILY
COMMUNITY

## 2021-02-24 RX ORDER — LORATADINE 10 MG/1
10 TABLET ORAL DAILY
COMMUNITY

## 2021-02-24 NOTE — PROGRESS NOTES
Assessment/Plan:A 51-year-old patient is seen for her gyn evaluation  Her main issue is that she is urinating more than normal at times getting up 3 times at night  No problem-specific Assessment & Plan notes found for this encounter  Subjective:      Patient ID: Zipporah Denver is a 76 y o  female  This 51-year-old patient has had no chronic headaches or fainting spells or hot flashes  She is in bed from about 11 30 at night   On till 8:00 a m  in the morning and does wake up at times during the night  She has had no vaginal bleeding or vaginal infections over the year  She does wear a liner daily for occasional urine stress incontinence and vaginal discharge  She has had no urinary tract infections or kidney stones over the past year  Her bowel function is normal and she does not use laxatives on a regular basis or bleed with bowels  Her last mammogram was January 10, 2020 and was normal   She did have a DEXA scan in 2018 which showed osteopenia  Her weight is 135 lb and her blood pressure 108/64  Review of Systems   Constitutional: Negative  HENT: Negative  Eyes: Negative  Respiratory: Negative  Cardiovascular: Negative  Gastrointestinal: Negative  Endocrine: Negative  Genitourinary: Positive for frequency  She may get up at night 1-3 times to urinate and has a difficult time falling back to sleep afterwards  Musculoskeletal: Negative  Skin: Negative  Allergic/Immunologic: Negative  Neurological: Negative  Hematological: Negative  Psychiatric/Behavioral: Negative  Objective:      /64 (BP Location: Left arm, Patient Position: Sitting, Cuff Size: Standard)   Ht 5' 1" (1 549 m)   Wt 61 3 kg (135 lb 3 2 oz)   BMI 25 55 kg/m²          Physical Exam  Vitals signs and nursing note reviewed  Constitutional:       Appearance: Normal appearance  She is normal weight  HENT:      Head: Normocephalic        Nose: Nose normal  Mouth/Throat:      Mouth: Mucous membranes are moist    Eyes:      Extraocular Movements: Extraocular movements intact  Pupils: Pupils are equal, round, and reactive to light  Neck:      Musculoskeletal: Normal range of motion  Cardiovascular:      Rate and Rhythm: Normal rate and regular rhythm  Pulses: Normal pulses  Heart sounds: Normal heart sounds  No murmur  Pulmonary:      Effort: Pulmonary effort is normal       Breath sounds: Normal breath sounds  Abdominal:      General: Abdomen is flat  Bowel sounds are normal       Palpations: Abdomen is soft  Genitourinary:     General: Normal vulva  Rectum: Normal       Comments:   Breast exam shows no abnormal masses in the breasts  There is some fibrocystic areas palpable in the outer left breast   There is no evidence of nipple inversion or abnormalities in the skin of the breasts  Pelvic exam reveals uterus to be anterior mobile normal size and well supported  No adnexal masses are identified  The cervix is normal   There is no blood or discharge in the vagina  The vaginal mucosa is atrophic  The vulva is normal   Rectal exam shows no masses or blood in the rectum and no nodularity in the cul-de-sac  Musculoskeletal: Normal range of motion  Skin:     General: Skin is warm and dry  Neurological:      General: No focal deficit present  Mental Status: She is alert and oriented to person, place, and time     Psychiatric:         Mood and Affect: Mood normal          Behavior: Behavior normal

## 2021-02-24 NOTE — PATIENT INSTRUCTIONS
This 12-year-old patient was told that her breast and pelvic exam are normal   She does have some nocturia which is as much as 3 times at night at times  I ordered a urine culture and routine urinalysis to evaluate this  She has a request for a bilateral screening mammogram 3D  She has a  request for a DEXA scan to be done this year  Her last DEXA scan was in  2018 and showed osteopenia    Her last mammogram was January 10, 2020 and was normal

## 2021-02-25 ENCOUNTER — TRANSCRIBE ORDERS (OUTPATIENT)
Dept: ADMINISTRATIVE | Facility: HOSPITAL | Age: 76
End: 2021-02-25

## 2021-02-25 DIAGNOSIS — Z12.31 ENCOUNTER FOR SCREENING MAMMOGRAM FOR MALIGNANT NEOPLASM OF BREAST: Primary | ICD-10-CM

## 2021-02-26 LAB
LAB AP GYN PRIMARY INTERPRETATION: NORMAL
Lab: NORMAL

## 2021-02-27 LAB
APPEARANCE UR: CLEAR
BACTERIA UR QL AUTO: NORMAL /HPF
BILIRUB UR QL STRIP: NEGATIVE
COLOR UR: YELLOW
GLUCOSE UR QL STRIP: NEGATIVE
HGB UR QL STRIP: NEGATIVE
HYALINE CASTS #/AREA URNS LPF: NORMAL /LPF
KETONES UR QL STRIP: NEGATIVE
LEUKOCYTE ESTERASE UR QL STRIP: NEGATIVE
NITRITE UR QL STRIP: NEGATIVE
PH UR STRIP: NORMAL [PH] (ref 5–8)
PROT UR QL STRIP: NEGATIVE
RBC #/AREA URNS HPF: NORMAL /HPF
SP GR UR STRIP: 1.02 (ref 1–1.03)
SQUAMOUS #/AREA URNS HPF: NORMAL /HPF
WBC #/AREA URNS HPF: NORMAL /HPF

## 2021-03-01 ENCOUNTER — TELEPHONE (OUTPATIENT)
Dept: OBGYN CLINIC | Facility: CLINIC | Age: 76
End: 2021-03-01

## 2021-03-01 NOTE — TELEPHONE ENCOUNTER
No updated consent to leave detailed msg  Asked pt to cb just to let her know UA was neg     ----- Message from Nakia Mason MD sent at 3/1/2021  7:50 AM EST -----  Please call the patient regarding her Urinalysis  Her urinalysis showed no evidence of urinary infection

## 2021-04-09 ENCOUNTER — HOSPITAL ENCOUNTER (OUTPATIENT)
Dept: RADIOLOGY | Age: 76
Discharge: HOME | End: 2021-04-09
Payer: COMMERCIAL

## 2021-04-09 DIAGNOSIS — Z13.820 ENCOUNTER FOR SCREENING FOR OSTEOPOROSIS: ICD-10-CM

## 2021-04-09 DIAGNOSIS — Z78.0 POSTMENOPAUSAL STATUS, AGE-RELATED: ICD-10-CM

## 2021-04-09 PROCEDURE — 77080 DXA BONE DENSITY AXIAL: CPT

## 2021-04-12 ENCOUNTER — TELEPHONE (OUTPATIENT)
Dept: OBGYN CLINIC | Facility: CLINIC | Age: 76
End: 2021-04-12

## 2021-04-12 NOTE — TELEPHONE ENCOUNTER
Patient returning phone call regarding dexa results  I advised patient of Dr Michael Quezada recommendation  Patient would like to know what "degree of osteopenia" this is (high end or low end)? Also wants us to clarify with Dr Roxana Hoffman if the 1000 units of vitamin D is the same as vitamin D3?

## 2021-04-12 NOTE — TELEPHONE ENCOUNTER
Gonzalez Mcneil,    Could you please address the pts questions below and send back to clinical bin once reviewed? Thank you!

## 2021-04-12 NOTE — TELEPHONE ENCOUNTER
No updated comm consent on file  Lm to cb    ----- Message from Eyad Orourke MD sent at 4/12/2021  7:57 AM EDT -----  Please call the patient regarding her DEXA scan  Her DEXA scan shows osteopenia which is best treated with 0 vitamin-D 1000 units daily and calcium 1200 mg a day plus walking as much as possible

## 2021-04-14 ENCOUNTER — TELEPHONE (OUTPATIENT)
Dept: OBGYN CLINIC | Facility: CLINIC | Age: 76
End: 2021-04-14

## 2021-04-16 NOTE — TELEPHONE ENCOUNTER
Spoke to Izzy Wynn and she said she got her questions answered by someone ( I saw no note)  She says she has no further questions

## 2021-06-01 ENCOUNTER — HOSPITAL ENCOUNTER (OUTPATIENT)
Dept: RADIOLOGY | Facility: HOSPITAL | Age: 76
Discharge: HOME | End: 2021-06-01
Payer: COMMERCIAL

## 2021-06-01 ENCOUNTER — TRANSCRIBE ORDERS (OUTPATIENT)
Dept: RADIOLOGY | Facility: HOSPITAL | Age: 76
End: 2021-06-01

## 2021-06-01 DIAGNOSIS — M25.562 ARTHRALGIA OF BOTH LOWER LEGS: Primary | ICD-10-CM

## 2021-06-01 DIAGNOSIS — M25.562 ARTHRALGIA OF BOTH LOWER LEGS: ICD-10-CM

## 2021-06-01 DIAGNOSIS — M25.561 ARTHRALGIA OF BOTH LOWER LEGS: ICD-10-CM

## 2021-06-01 DIAGNOSIS — M25.561 ARTHRALGIA OF BOTH LOWER LEGS: Primary | ICD-10-CM

## 2021-06-01 PROCEDURE — 73562 X-RAY EXAM OF KNEE 3: CPT

## 2021-08-11 ENCOUNTER — HOSPITAL ENCOUNTER (OUTPATIENT)
Dept: RADIOLOGY | Age: 76
Discharge: HOME | End: 2021-08-11
Payer: COMMERCIAL

## 2021-08-11 VITALS — BODY MASS INDEX: 24.17 KG/M2 | HEIGHT: 61 IN | WEIGHT: 128 LBS

## 2021-08-11 DIAGNOSIS — Z12.31 SCREENING MAMMOGRAM, ENCOUNTER FOR: ICD-10-CM

## 2021-08-11 PROCEDURE — 77063 BREAST TOMOSYNTHESIS BI: CPT

## 2021-08-11 PROCEDURE — 77067 SCR MAMMO BI INCL CAD: CPT

## 2021-11-12 ENCOUNTER — HOSPITAL ENCOUNTER (INPATIENT)
Facility: HOSPITAL | Age: 76
LOS: 1 days | Discharge: HOME | DRG: 379 | End: 2021-11-15
Attending: EMERGENCY MEDICINE | Admitting: INTERNAL MEDICINE
Payer: COMMERCIAL

## 2021-11-12 DIAGNOSIS — K62.5 BRIGHT RED BLOOD PER RECTUM: ICD-10-CM

## 2021-11-12 DIAGNOSIS — K92.2 LOWER GI BLEED: Primary | ICD-10-CM

## 2021-11-12 DIAGNOSIS — K63.5 COLON POLYPS: ICD-10-CM

## 2021-11-12 LAB
ABO GROUP BLD: NORMAL
ABO GROUP BLD: NORMAL
ALBUMIN SERPL BCP-MCNC: 3.4 G/DL (ref 3.5–5)
ALP SERPL-CCNC: 88 U/L (ref 46–116)
ALT SERPL W P-5'-P-CCNC: 20 U/L (ref 12–78)
ANION GAP SERPL CALCULATED.3IONS-SCNC: 3 MMOL/L (ref 4–13)
APTT PPP: 28 SECONDS (ref 23–37)
AST SERPL W P-5'-P-CCNC: 14 U/L (ref 5–45)
ATRIAL RATE: 64 BPM
BASOPHILS # BLD AUTO: 0.04 THOUSANDS/ΜL (ref 0–0.1)
BASOPHILS NFR BLD AUTO: 1 % (ref 0–1)
BILIRUB SERPL-MCNC: 0.26 MG/DL (ref 0.2–1)
BLD GP AB SCN SERPL QL: NEGATIVE
BUN SERPL-MCNC: 28 MG/DL (ref 5–25)
CALCIUM ALBUM COR SERPL-MCNC: 9.5 MG/DL (ref 8.3–10.1)
CALCIUM SERPL-MCNC: 9 MG/DL (ref 8.3–10.1)
CHLORIDE SERPL-SCNC: 107 MMOL/L (ref 100–108)
CO2 SERPL-SCNC: 30 MMOL/L (ref 21–32)
CREAT SERPL-MCNC: 1.15 MG/DL (ref 0.6–1.3)
EOSINOPHIL # BLD AUTO: 0.1 THOUSAND/ΜL (ref 0–0.61)
EOSINOPHIL NFR BLD AUTO: 2 % (ref 0–6)
ERYTHROCYTE [DISTWIDTH] IN BLOOD BY AUTOMATED COUNT: 13.6 % (ref 11.6–15.1)
GFR SERPL CREATININE-BSD FRML MDRD: 46 ML/MIN/1.73SQ M
GLUCOSE SERPL-MCNC: 88 MG/DL (ref 65–140)
HCT VFR BLD AUTO: 34.8 % (ref 34.8–46.1)
HGB BLD-MCNC: 11 G/DL (ref 11.5–15.4)
IMM GRANULOCYTES # BLD AUTO: 0.02 THOUSAND/UL (ref 0–0.2)
IMM GRANULOCYTES NFR BLD AUTO: 0 % (ref 0–2)
INR PPP: 0.97 (ref 0.84–1.19)
LYMPHOCYTES # BLD AUTO: 1.71 THOUSANDS/ΜL (ref 0.6–4.47)
LYMPHOCYTES NFR BLD AUTO: 25 % (ref 14–44)
MCH RBC QN AUTO: 30.7 PG (ref 26.8–34.3)
MCHC RBC AUTO-ENTMCNC: 31.6 G/DL (ref 31.4–37.4)
MCV RBC AUTO: 97 FL (ref 82–98)
MONOCYTES # BLD AUTO: 0.47 THOUSAND/ΜL (ref 0.17–1.22)
MONOCYTES NFR BLD AUTO: 7 % (ref 4–12)
NEUTROPHILS # BLD AUTO: 4.48 THOUSANDS/ΜL (ref 1.85–7.62)
NEUTS SEG NFR BLD AUTO: 65 % (ref 43–75)
NRBC BLD AUTO-RTO: 0 /100 WBCS
P AXIS: 79 DEGREES
PLATELET # BLD AUTO: 194 THOUSANDS/UL (ref 149–390)
PMV BLD AUTO: 12.2 FL (ref 8.9–12.7)
POTASSIUM SERPL-SCNC: 3.8 MMOL/L (ref 3.5–5.3)
PR INTERVAL: 166 MS
PROT SERPL-MCNC: 7 G/DL (ref 6.4–8.2)
PROTHROMBIN TIME: 12.5 SECONDS (ref 11.6–14.5)
QRS AXIS: 58 DEGREES
QRSD INTERVAL: 112 MS
QT INTERVAL: 422 MS
QTC INTERVAL: 435 MS
RBC # BLD AUTO: 3.58 MILLION/UL (ref 3.81–5.12)
RH BLD: POSITIVE
RH BLD: POSITIVE
SODIUM SERPL-SCNC: 140 MMOL/L (ref 136–145)
SPECIMEN EXPIRATION DATE: NORMAL
T WAVE AXIS: 57 DEGREES
VENTRICULAR RATE: 64 BPM
WBC # BLD AUTO: 6.82 THOUSAND/UL (ref 4.31–10.16)

## 2021-11-12 PROCEDURE — 86901 BLOOD TYPING SEROLOGIC RH(D): CPT | Performed by: EMERGENCY MEDICINE

## 2021-11-12 PROCEDURE — 93010 ELECTROCARDIOGRAM REPORT: CPT | Performed by: INTERNAL MEDICINE

## 2021-11-12 PROCEDURE — 99285 EMERGENCY DEPT VISIT HI MDM: CPT

## 2021-11-12 PROCEDURE — 85025 COMPLETE CBC W/AUTO DIFF WBC: CPT | Performed by: EMERGENCY MEDICINE

## 2021-11-12 PROCEDURE — 36415 COLL VENOUS BLD VENIPUNCTURE: CPT | Performed by: EMERGENCY MEDICINE

## 2021-11-12 PROCEDURE — 86900 BLOOD TYPING SEROLOGIC ABO: CPT | Performed by: EMERGENCY MEDICINE

## 2021-11-12 PROCEDURE — 85610 PROTHROMBIN TIME: CPT | Performed by: EMERGENCY MEDICINE

## 2021-11-12 PROCEDURE — 93005 ELECTROCARDIOGRAM TRACING: CPT

## 2021-11-12 PROCEDURE — 86850 RBC ANTIBODY SCREEN: CPT | Performed by: EMERGENCY MEDICINE

## 2021-11-12 PROCEDURE — 85730 THROMBOPLASTIN TIME PARTIAL: CPT | Performed by: EMERGENCY MEDICINE

## 2021-11-12 PROCEDURE — 99285 EMERGENCY DEPT VISIT HI MDM: CPT | Performed by: EMERGENCY MEDICINE

## 2021-11-12 PROCEDURE — 80053 COMPREHEN METABOLIC PANEL: CPT | Performed by: EMERGENCY MEDICINE

## 2021-11-12 RX ORDER — PANTOPRAZOLE SODIUM 40 MG/1
40 TABLET, DELAYED RELEASE ORAL
Status: DISCONTINUED | OUTPATIENT
Start: 2021-11-13 | End: 2021-11-15 | Stop reason: HOSPADM

## 2021-11-12 RX ORDER — SODIUM CHLORIDE, SODIUM GLUCONATE, SODIUM ACETATE, POTASSIUM CHLORIDE, MAGNESIUM CHLORIDE, SODIUM PHOSPHATE, DIBASIC, AND POTASSIUM PHOSPHATE .53; .5; .37; .037; .03; .012; .00082 G/100ML; G/100ML; G/100ML; G/100ML; G/100ML; G/100ML; G/100ML
100 INJECTION, SOLUTION INTRAVENOUS CONTINUOUS
Status: DISCONTINUED | OUTPATIENT
Start: 2021-11-12 | End: 2021-11-13

## 2021-11-13 PROBLEM — K62.5 BRIGHT RED BLOOD PER RECTUM: Status: ACTIVE | Noted: 2021-11-13

## 2021-11-13 LAB
ALBUMIN SERPL BCP-MCNC: 2.8 G/DL (ref 3.5–5)
ALP SERPL-CCNC: 72 U/L (ref 46–116)
ALT SERPL W P-5'-P-CCNC: 16 U/L (ref 12–78)
ANION GAP SERPL CALCULATED.3IONS-SCNC: 3 MMOL/L (ref 4–13)
AST SERPL W P-5'-P-CCNC: 11 U/L (ref 5–45)
BASOPHILS # BLD AUTO: 0.04 THOUSANDS/ΜL (ref 0–0.1)
BASOPHILS NFR BLD AUTO: 1 % (ref 0–1)
BILIRUB SERPL-MCNC: 0.45 MG/DL (ref 0.2–1)
BUN SERPL-MCNC: 25 MG/DL (ref 5–25)
CALCIUM ALBUM COR SERPL-MCNC: 9.2 MG/DL (ref 8.3–10.1)
CALCIUM SERPL-MCNC: 8.2 MG/DL (ref 8.3–10.1)
CHLORIDE SERPL-SCNC: 109 MMOL/L (ref 100–108)
CO2 SERPL-SCNC: 29 MMOL/L (ref 21–32)
CREAT SERPL-MCNC: 0.88 MG/DL (ref 0.6–1.3)
EOSINOPHIL # BLD AUTO: 0.15 THOUSAND/ΜL (ref 0–0.61)
EOSINOPHIL NFR BLD AUTO: 3 % (ref 0–6)
ERYTHROCYTE [DISTWIDTH] IN BLOOD BY AUTOMATED COUNT: 13.6 % (ref 11.6–15.1)
GFR SERPL CREATININE-BSD FRML MDRD: 64 ML/MIN/1.73SQ M
GLUCOSE SERPL-MCNC: 96 MG/DL (ref 65–140)
HCT VFR BLD AUTO: 29.3 % (ref 34.8–46.1)
HGB BLD-MCNC: 10 G/DL (ref 11.5–15.4)
HGB BLD-MCNC: 10.4 G/DL (ref 11.5–15.4)
HGB BLD-MCNC: 9.2 G/DL (ref 11.5–15.4)
IMM GRANULOCYTES # BLD AUTO: 0.01 THOUSAND/UL (ref 0–0.2)
IMM GRANULOCYTES NFR BLD AUTO: 0 % (ref 0–2)
LYMPHOCYTES # BLD AUTO: 1.56 THOUSANDS/ΜL (ref 0.6–4.47)
LYMPHOCYTES NFR BLD AUTO: 27 % (ref 14–44)
MCH RBC QN AUTO: 30.7 PG (ref 26.8–34.3)
MCHC RBC AUTO-ENTMCNC: 31.4 G/DL (ref 31.4–37.4)
MCV RBC AUTO: 98 FL (ref 82–98)
MONOCYTES # BLD AUTO: 0.44 THOUSAND/ΜL (ref 0.17–1.22)
MONOCYTES NFR BLD AUTO: 8 % (ref 4–12)
NEUTROPHILS # BLD AUTO: 3.54 THOUSANDS/ΜL (ref 1.85–7.62)
NEUTS SEG NFR BLD AUTO: 61 % (ref 43–75)
NRBC BLD AUTO-RTO: 0 /100 WBCS
PLATELET # BLD AUTO: 167 THOUSANDS/UL (ref 149–390)
PMV BLD AUTO: 12.4 FL (ref 8.9–12.7)
POTASSIUM SERPL-SCNC: 4 MMOL/L (ref 3.5–5.3)
PROT SERPL-MCNC: 5.9 G/DL (ref 6.4–8.2)
RBC # BLD AUTO: 3 MILLION/UL (ref 3.81–5.12)
SODIUM SERPL-SCNC: 141 MMOL/L (ref 136–145)
WBC # BLD AUTO: 5.74 THOUSAND/UL (ref 4.31–10.16)

## 2021-11-13 PROCEDURE — 85018 HEMOGLOBIN: CPT | Performed by: STUDENT IN AN ORGANIZED HEALTH CARE EDUCATION/TRAINING PROGRAM

## 2021-11-13 PROCEDURE — 80053 COMPREHEN METABOLIC PANEL: CPT | Performed by: STUDENT IN AN ORGANIZED HEALTH CARE EDUCATION/TRAINING PROGRAM

## 2021-11-13 PROCEDURE — 99219 PR INITIAL OBSERVATION CARE/DAY 50 MINUTES: CPT | Performed by: INTERNAL MEDICINE

## 2021-11-13 PROCEDURE — 85025 COMPLETE CBC W/AUTO DIFF WBC: CPT | Performed by: STUDENT IN AN ORGANIZED HEALTH CARE EDUCATION/TRAINING PROGRAM

## 2021-11-13 PROCEDURE — 36415 COLL VENOUS BLD VENIPUNCTURE: CPT | Performed by: STUDENT IN AN ORGANIZED HEALTH CARE EDUCATION/TRAINING PROGRAM

## 2021-11-13 PROCEDURE — 99205 OFFICE O/P NEW HI 60 MIN: CPT | Performed by: INTERNAL MEDICINE

## 2021-11-13 PROCEDURE — NC001 PR NO CHARGE: Performed by: INTERNAL MEDICINE

## 2021-11-13 RX ORDER — LORATADINE 10 MG/1
10 TABLET ORAL DAILY
Status: DISCONTINUED | OUTPATIENT
Start: 2021-11-13 | End: 2021-11-15 | Stop reason: HOSPADM

## 2021-11-13 RX ORDER — POLYETHYLENE GLYCOL 3350 17 G/17G
238 POWDER, FOR SOLUTION ORAL ONCE
Status: COMPLETED | OUTPATIENT
Start: 2021-11-13 | End: 2021-11-13

## 2021-11-13 RX ADMIN — POLYETHYLENE GLYCOL 3350 238 G: 17 POWDER, FOR SOLUTION ORAL at 18:38

## 2021-11-13 RX ADMIN — LORATADINE 10 MG: 10 TABLET ORAL at 12:01

## 2021-11-13 RX ADMIN — SODIUM CHLORIDE, SODIUM GLUCONATE, SODIUM ACETATE, POTASSIUM CHLORIDE, MAGNESIUM CHLORIDE, SODIUM PHOSPHATE, DIBASIC, AND POTASSIUM PHOSPHATE 100 ML/HR: .53; .5; .37; .037; .03; .012; .00082 INJECTION, SOLUTION INTRAVENOUS at 00:00

## 2021-11-14 LAB
ANION GAP SERPL CALCULATED.3IONS-SCNC: 6 MMOL/L (ref 4–13)
BASOPHILS # BLD AUTO: 0.04 THOUSANDS/ΜL (ref 0–0.1)
BASOPHILS NFR BLD AUTO: 1 % (ref 0–1)
BUN SERPL-MCNC: 13 MG/DL (ref 5–25)
CALCIUM SERPL-MCNC: 8.8 MG/DL (ref 8.3–10.1)
CHLORIDE SERPL-SCNC: 110 MMOL/L (ref 100–108)
CO2 SERPL-SCNC: 26 MMOL/L (ref 21–32)
CREAT SERPL-MCNC: 0.8 MG/DL (ref 0.6–1.3)
EOSINOPHIL # BLD AUTO: 0.12 THOUSAND/ΜL (ref 0–0.61)
EOSINOPHIL NFR BLD AUTO: 2 % (ref 0–6)
ERYTHROCYTE [DISTWIDTH] IN BLOOD BY AUTOMATED COUNT: 13.7 % (ref 11.6–15.1)
GFR SERPL CREATININE-BSD FRML MDRD: 72 ML/MIN/1.73SQ M
GLUCOSE SERPL-MCNC: 96 MG/DL (ref 65–140)
HCT VFR BLD AUTO: 31.7 % (ref 34.8–46.1)
HGB BLD-MCNC: 10 G/DL (ref 11.5–15.4)
HGB BLD-MCNC: 10.3 G/DL (ref 11.5–15.4)
IMM GRANULOCYTES # BLD AUTO: 0.02 THOUSAND/UL (ref 0–0.2)
IMM GRANULOCYTES NFR BLD AUTO: 0 % (ref 0–2)
LYMPHOCYTES # BLD AUTO: 1.52 THOUSANDS/ΜL (ref 0.6–4.47)
LYMPHOCYTES NFR BLD AUTO: 26 % (ref 14–44)
MCH RBC QN AUTO: 30.6 PG (ref 26.8–34.3)
MCHC RBC AUTO-ENTMCNC: 31.5 G/DL (ref 31.4–37.4)
MCV RBC AUTO: 97 FL (ref 82–98)
MONOCYTES # BLD AUTO: 0.45 THOUSAND/ΜL (ref 0.17–1.22)
MONOCYTES NFR BLD AUTO: 8 % (ref 4–12)
NEUTROPHILS # BLD AUTO: 3.66 THOUSANDS/ΜL (ref 1.85–7.62)
NEUTS SEG NFR BLD AUTO: 63 % (ref 43–75)
NRBC BLD AUTO-RTO: 0 /100 WBCS
PLATELET # BLD AUTO: 179 THOUSANDS/UL (ref 149–390)
PMV BLD AUTO: 12.8 FL (ref 8.9–12.7)
POTASSIUM SERPL-SCNC: 4 MMOL/L (ref 3.5–5.3)
RBC # BLD AUTO: 3.27 MILLION/UL (ref 3.81–5.12)
SODIUM SERPL-SCNC: 142 MMOL/L (ref 136–145)
WBC # BLD AUTO: 5.81 THOUSAND/UL (ref 4.31–10.16)

## 2021-11-14 PROCEDURE — 99232 SBSQ HOSP IP/OBS MODERATE 35: CPT | Performed by: INTERNAL MEDICINE

## 2021-11-14 PROCEDURE — 80048 BASIC METABOLIC PNL TOTAL CA: CPT | Performed by: STUDENT IN AN ORGANIZED HEALTH CARE EDUCATION/TRAINING PROGRAM

## 2021-11-14 PROCEDURE — 85025 COMPLETE CBC W/AUTO DIFF WBC: CPT | Performed by: STUDENT IN AN ORGANIZED HEALTH CARE EDUCATION/TRAINING PROGRAM

## 2021-11-14 PROCEDURE — 85018 HEMOGLOBIN: CPT | Performed by: STUDENT IN AN ORGANIZED HEALTH CARE EDUCATION/TRAINING PROGRAM

## 2021-11-14 RX ORDER — POLYETHYLENE GLYCOL 3350 17 G/17G
238 POWDER, FOR SOLUTION ORAL ONCE
Status: COMPLETED | OUTPATIENT
Start: 2021-11-14 | End: 2021-11-14

## 2021-11-14 RX ADMIN — POLYETHYLENE GLYCOL 3350 238 G: 17 POWDER, FOR SOLUTION ORAL at 17:06

## 2021-11-14 RX ADMIN — BISACODYL 10 MG: 5 TABLET, COATED ORAL at 17:06

## 2021-11-14 RX ADMIN — LORATADINE 10 MG: 10 TABLET ORAL at 08:21

## 2021-11-14 RX ADMIN — PANTOPRAZOLE SODIUM 40 MG: 40 TABLET, DELAYED RELEASE ORAL at 06:04

## 2021-11-15 ENCOUNTER — APPOINTMENT (INPATIENT)
Dept: GASTROENTEROLOGY | Facility: HOSPITAL | Age: 76
DRG: 379 | End: 2021-11-15
Payer: COMMERCIAL

## 2021-11-15 ENCOUNTER — ANESTHESIA (INPATIENT)
Dept: GASTROENTEROLOGY | Facility: HOSPITAL | Age: 76
DRG: 379 | End: 2021-11-15
Payer: COMMERCIAL

## 2021-11-15 ENCOUNTER — ANESTHESIA EVENT (INPATIENT)
Dept: GASTROENTEROLOGY | Facility: HOSPITAL | Age: 76
DRG: 379 | End: 2021-11-15
Payer: COMMERCIAL

## 2021-11-15 VITALS
OXYGEN SATURATION: 97 % | RESPIRATION RATE: 18 BRPM | TEMPERATURE: 96.9 F | DIASTOLIC BLOOD PRESSURE: 54 MMHG | WEIGHT: 128.09 LBS | HEIGHT: 61 IN | BODY MASS INDEX: 24.18 KG/M2 | HEART RATE: 68 BPM | SYSTOLIC BLOOD PRESSURE: 112 MMHG

## 2021-11-15 PROBLEM — K63.5 COLON POLYPS: Status: ACTIVE | Noted: 2021-11-15

## 2021-11-15 PROBLEM — J18.9 CAP (COMMUNITY ACQUIRED PNEUMONIA): Status: RESOLVED | Noted: 2018-08-13 | Resolved: 2021-11-15

## 2021-11-15 LAB
ANION GAP SERPL CALCULATED.3IONS-SCNC: 6 MMOL/L (ref 4–13)
BASOPHILS # BLD AUTO: 0.04 THOUSANDS/ΜL (ref 0–0.1)
BASOPHILS NFR BLD AUTO: 1 % (ref 0–1)
BUN SERPL-MCNC: 9 MG/DL (ref 5–25)
CALCIUM SERPL-MCNC: 9.5 MG/DL (ref 8.3–10.1)
CHLORIDE SERPL-SCNC: 111 MMOL/L (ref 100–108)
CO2 SERPL-SCNC: 24 MMOL/L (ref 21–32)
CREAT SERPL-MCNC: 0.95 MG/DL (ref 0.6–1.3)
EOSINOPHIL # BLD AUTO: 0.13 THOUSAND/ΜL (ref 0–0.61)
EOSINOPHIL NFR BLD AUTO: 2 % (ref 0–6)
ERYTHROCYTE [DISTWIDTH] IN BLOOD BY AUTOMATED COUNT: 13.6 % (ref 11.6–15.1)
GFR SERPL CREATININE-BSD FRML MDRD: 58 ML/MIN/1.73SQ M
GLUCOSE SERPL-MCNC: 97 MG/DL (ref 65–140)
HCT VFR BLD AUTO: 33.3 % (ref 34.8–46.1)
HGB BLD-MCNC: 10.2 G/DL (ref 11.5–15.4)
HGB BLD-MCNC: 10.3 G/DL (ref 11.5–15.4)
IMM GRANULOCYTES # BLD AUTO: 0.02 THOUSAND/UL (ref 0–0.2)
IMM GRANULOCYTES NFR BLD AUTO: 0 % (ref 0–2)
LYMPHOCYTES # BLD AUTO: 1.05 THOUSANDS/ΜL (ref 0.6–4.47)
LYMPHOCYTES NFR BLD AUTO: 19 % (ref 14–44)
MCH RBC QN AUTO: 30.7 PG (ref 26.8–34.3)
MCHC RBC AUTO-ENTMCNC: 30.9 G/DL (ref 31.4–37.4)
MCV RBC AUTO: 99 FL (ref 82–98)
MONOCYTES # BLD AUTO: 0.38 THOUSAND/ΜL (ref 0.17–1.22)
MONOCYTES NFR BLD AUTO: 7 % (ref 4–12)
NEUTROPHILS # BLD AUTO: 3.91 THOUSANDS/ΜL (ref 1.85–7.62)
NEUTS SEG NFR BLD AUTO: 71 % (ref 43–75)
NRBC BLD AUTO-RTO: 0 /100 WBCS
PLATELET # BLD AUTO: 209 THOUSANDS/UL (ref 149–390)
PMV BLD AUTO: 12.2 FL (ref 8.9–12.7)
POTASSIUM SERPL-SCNC: 3.2 MMOL/L (ref 3.5–5.3)
RBC # BLD AUTO: 3.35 MILLION/UL (ref 3.81–5.12)
SODIUM SERPL-SCNC: 141 MMOL/L (ref 136–145)
WBC # BLD AUTO: 5.53 THOUSAND/UL (ref 4.31–10.16)

## 2021-11-15 PROCEDURE — 99238 HOSP IP/OBS DSCHRG MGMT 30/<: CPT | Performed by: INTERNAL MEDICINE

## 2021-11-15 PROCEDURE — 45378 DIAGNOSTIC COLONOSCOPY: CPT | Performed by: INTERNAL MEDICINE

## 2021-11-15 PROCEDURE — 85018 HEMOGLOBIN: CPT | Performed by: STUDENT IN AN ORGANIZED HEALTH CARE EDUCATION/TRAINING PROGRAM

## 2021-11-15 PROCEDURE — 85025 COMPLETE CBC W/AUTO DIFF WBC: CPT | Performed by: STUDENT IN AN ORGANIZED HEALTH CARE EDUCATION/TRAINING PROGRAM

## 2021-11-15 PROCEDURE — 0DJD8ZZ INSPECTION OF LOWER INTESTINAL TRACT, VIA NATURAL OR ARTIFICIAL OPENING ENDOSCOPIC: ICD-10-PCS | Performed by: INTERNAL MEDICINE

## 2021-11-15 PROCEDURE — 80048 BASIC METABOLIC PNL TOTAL CA: CPT | Performed by: STUDENT IN AN ORGANIZED HEALTH CARE EDUCATION/TRAINING PROGRAM

## 2021-11-15 RX ORDER — SODIUM CHLORIDE, SODIUM LACTATE, POTASSIUM CHLORIDE, CALCIUM CHLORIDE 600; 310; 30; 20 MG/100ML; MG/100ML; MG/100ML; MG/100ML
INJECTION, SOLUTION INTRAVENOUS CONTINUOUS PRN
Status: DISCONTINUED | OUTPATIENT
Start: 2021-11-15 | End: 2021-11-15

## 2021-11-15 RX ORDER — POTASSIUM CHLORIDE 20 MEQ/1
40 TABLET, EXTENDED RELEASE ORAL 2 TIMES DAILY
Status: DISCONTINUED | OUTPATIENT
Start: 2021-11-15 | End: 2021-11-15 | Stop reason: HOSPADM

## 2021-11-15 RX ORDER — EPHEDRINE SULFATE 50 MG/ML
INJECTION INTRAVENOUS AS NEEDED
Status: DISCONTINUED | OUTPATIENT
Start: 2021-11-15 | End: 2021-11-15

## 2021-11-15 RX ORDER — MAGNESIUM CARB/ALUMINUM HYDROX 105-160MG
296 TABLET,CHEWABLE ORAL ONCE
Status: COMPLETED | OUTPATIENT
Start: 2021-11-15 | End: 2021-11-15

## 2021-11-15 RX ORDER — PROPOFOL 10 MG/ML
INJECTION, EMULSION INTRAVENOUS AS NEEDED
Status: DISCONTINUED | OUTPATIENT
Start: 2021-11-15 | End: 2021-11-15

## 2021-11-15 RX ORDER — PANTOPRAZOLE SODIUM 40 MG/1
40 TABLET, DELAYED RELEASE ORAL
Qty: 30 TABLET | Refills: 0 | Status: SHIPPED | OUTPATIENT
Start: 2021-11-16 | End: 2021-12-16

## 2021-11-15 RX ADMIN — SODIUM CHLORIDE, SODIUM LACTATE, POTASSIUM CHLORIDE, AND CALCIUM CHLORIDE: .6; .31; .03; .02 INJECTION, SOLUTION INTRAVENOUS at 12:56

## 2021-11-15 RX ADMIN — PANTOPRAZOLE SODIUM 40 MG: 40 TABLET, DELAYED RELEASE ORAL at 05:26

## 2021-11-15 RX ADMIN — PROPOFOL 50 MG: 10 INJECTION, EMULSION INTRAVENOUS at 13:09

## 2021-11-15 RX ADMIN — PROPOFOL 40 MG: 10 INJECTION, EMULSION INTRAVENOUS at 13:40

## 2021-11-15 RX ADMIN — PROPOFOL 50 MG: 10 INJECTION, EMULSION INTRAVENOUS at 13:26

## 2021-11-15 RX ADMIN — EPHEDRINE SULFATE 10 MG: 50 INJECTION, SOLUTION INTRAVENOUS at 12:56

## 2021-11-15 RX ADMIN — PROPOFOL 60 MG: 10 INJECTION, EMULSION INTRAVENOUS at 13:17

## 2021-11-15 RX ADMIN — PROPOFOL 50 MG: 10 INJECTION, EMULSION INTRAVENOUS at 13:33

## 2021-11-15 RX ADMIN — MAGNESIUM CITRATE 296 ML: 1.75 LIQUID ORAL at 07:44

## 2021-11-15 RX ADMIN — EPHEDRINE SULFATE 10 MG: 50 INJECTION, SOLUTION INTRAVENOUS at 13:17

## 2021-11-15 RX ADMIN — EPHEDRINE SULFATE 10 MG: 50 INJECTION, SOLUTION INTRAVENOUS at 13:36

## 2021-11-15 RX ADMIN — PROPOFOL 100 MG: 10 INJECTION, EMULSION INTRAVENOUS at 13:00

## 2021-11-15 RX ADMIN — PROPOFOL 50 MG: 10 INJECTION, EMULSION INTRAVENOUS at 13:36

## 2021-11-16 ENCOUNTER — DOCUMENTATION (OUTPATIENT)
Dept: OTHER | Facility: HOSPITAL | Age: 76
End: 2021-11-16

## 2021-11-17 ENCOUNTER — NURSE TRIAGE (OUTPATIENT)
Dept: OTHER | Facility: OTHER | Age: 76
End: 2021-11-17

## 2021-11-17 ENCOUNTER — HOSPITAL ENCOUNTER (INPATIENT)
Facility: HOSPITAL | Age: 76
LOS: 1 days | Discharge: HOME | DRG: 811 | End: 2021-11-19
Attending: EMERGENCY MEDICINE | Admitting: INTERNAL MEDICINE
Payer: COMMERCIAL

## 2021-11-17 ENCOUNTER — APPOINTMENT (EMERGENCY)
Dept: RADIOLOGY | Facility: HOSPITAL | Age: 76
DRG: 811 | End: 2021-11-17
Payer: COMMERCIAL

## 2021-11-17 DIAGNOSIS — K63.5 COLON POLYPS: ICD-10-CM

## 2021-11-17 DIAGNOSIS — D62 ANEMIA DUE TO BLOOD LOSS, ACUTE: ICD-10-CM

## 2021-11-17 DIAGNOSIS — K62.5 RECTAL BLEEDING: Primary | ICD-10-CM

## 2021-11-17 LAB
ALBUMIN SERPL BCP-MCNC: 3.8 G/DL (ref 3.5–5)
ALP SERPL-CCNC: 100 U/L (ref 46–116)
ALT SERPL W P-5'-P-CCNC: 21 U/L (ref 12–78)
ANION GAP SERPL CALCULATED.3IONS-SCNC: 6 MMOL/L (ref 4–13)
AST SERPL W P-5'-P-CCNC: 17 U/L (ref 5–45)
ATRIAL RATE: 66 BPM
BASOPHILS # BLD AUTO: 0.05 THOUSANDS/ΜL (ref 0–0.1)
BASOPHILS NFR BLD AUTO: 1 % (ref 0–1)
BILIRUB SERPL-MCNC: 0.5 MG/DL (ref 0.2–1)
BUN SERPL-MCNC: 22 MG/DL (ref 5–25)
CALCIUM SERPL-MCNC: 9.1 MG/DL (ref 8.3–10.1)
CHLORIDE SERPL-SCNC: 107 MMOL/L (ref 100–108)
CO2 SERPL-SCNC: 24 MMOL/L (ref 21–32)
CREAT SERPL-MCNC: 1 MG/DL (ref 0.6–1.3)
EOSINOPHIL # BLD AUTO: 0.07 THOUSAND/ΜL (ref 0–0.61)
EOSINOPHIL NFR BLD AUTO: 1 % (ref 0–6)
ERYTHROCYTE [DISTWIDTH] IN BLOOD BY AUTOMATED COUNT: 13.8 % (ref 11.6–15.1)
FERRITIN SERPL-MCNC: 16 NG/ML (ref 8–388)
GFR SERPL CREATININE-BSD FRML MDRD: 55 ML/MIN/1.73SQ M
GLUCOSE SERPL-MCNC: 117 MG/DL (ref 65–140)
HCT VFR BLD AUTO: 23.3 % (ref 34.8–46.1)
HCT VFR BLD AUTO: 29.2 % (ref 34.8–46.1)
HGB BLD-MCNC: 7.4 G/DL (ref 11.5–15.4)
HGB BLD-MCNC: 9.3 G/DL (ref 11.5–15.4)
IMM GRANULOCYTES # BLD AUTO: 0.03 THOUSAND/UL (ref 0–0.2)
IMM GRANULOCYTES NFR BLD AUTO: 0 % (ref 0–2)
IRON SATN MFR SERPL: 15 % (ref 15–50)
IRON SERPL-MCNC: 50 UG/DL (ref 50–170)
LIPASE SERPL-CCNC: 73 U/L (ref 73–393)
LYMPHOCYTES # BLD AUTO: 1.22 THOUSANDS/ΜL (ref 0.6–4.47)
LYMPHOCYTES NFR BLD AUTO: 14 % (ref 14–44)
MCH RBC QN AUTO: 31.2 PG (ref 26.8–34.3)
MCHC RBC AUTO-ENTMCNC: 31.8 G/DL (ref 31.4–37.4)
MCV RBC AUTO: 98 FL (ref 82–98)
MONOCYTES # BLD AUTO: 0.42 THOUSAND/ΜL (ref 0.17–1.22)
MONOCYTES NFR BLD AUTO: 5 % (ref 4–12)
NEUTROPHILS # BLD AUTO: 6.77 THOUSANDS/ΜL (ref 1.85–7.62)
NEUTS SEG NFR BLD AUTO: 79 % (ref 43–75)
NRBC BLD AUTO-RTO: 0 /100 WBCS
P AXIS: 80 DEGREES
PLATELET # BLD AUTO: 218 THOUSANDS/UL (ref 149–390)
PMV BLD AUTO: 13.2 FL (ref 8.9–12.7)
POTASSIUM SERPL-SCNC: 3.5 MMOL/L (ref 3.5–5.3)
PR INTERVAL: 162 MS
PROT SERPL-MCNC: 7.4 G/DL (ref 6.4–8.2)
QRS AXIS: 44 DEGREES
QRSD INTERVAL: 108 MS
QT INTERVAL: 390 MS
QTC INTERVAL: 408 MS
RBC # BLD AUTO: 2.98 MILLION/UL (ref 3.81–5.12)
SODIUM SERPL-SCNC: 137 MMOL/L (ref 136–145)
T WAVE AXIS: 54 DEGREES
TIBC SERPL-MCNC: 336 UG/DL (ref 250–450)
VENTRICULAR RATE: 66 BPM
WBC # BLD AUTO: 8.56 THOUSAND/UL (ref 4.31–10.16)

## 2021-11-17 PROCEDURE — 83550 IRON BINDING TEST: CPT | Performed by: STUDENT IN AN ORGANIZED HEALTH CARE EDUCATION/TRAINING PROGRAM

## 2021-11-17 PROCEDURE — 83690 ASSAY OF LIPASE: CPT | Performed by: EMERGENCY MEDICINE

## 2021-11-17 PROCEDURE — 74177 CT ABD & PELVIS W/CONTRAST: CPT

## 2021-11-17 PROCEDURE — 83540 ASSAY OF IRON: CPT | Performed by: STUDENT IN AN ORGANIZED HEALTH CARE EDUCATION/TRAINING PROGRAM

## 2021-11-17 PROCEDURE — 36415 COLL VENOUS BLD VENIPUNCTURE: CPT

## 2021-11-17 PROCEDURE — 93010 ELECTROCARDIOGRAM REPORT: CPT | Performed by: INTERNAL MEDICINE

## 2021-11-17 PROCEDURE — 85014 HEMATOCRIT: CPT | Performed by: STUDENT IN AN ORGANIZED HEALTH CARE EDUCATION/TRAINING PROGRAM

## 2021-11-17 PROCEDURE — 82728 ASSAY OF FERRITIN: CPT | Performed by: STUDENT IN AN ORGANIZED HEALTH CARE EDUCATION/TRAINING PROGRAM

## 2021-11-17 PROCEDURE — 85018 HEMOGLOBIN: CPT | Performed by: STUDENT IN AN ORGANIZED HEALTH CARE EDUCATION/TRAINING PROGRAM

## 2021-11-17 PROCEDURE — 99285 EMERGENCY DEPT VISIT HI MDM: CPT | Performed by: EMERGENCY MEDICINE

## 2021-11-17 PROCEDURE — 93005 ELECTROCARDIOGRAM TRACING: CPT

## 2021-11-17 PROCEDURE — 80053 COMPREHEN METABOLIC PANEL: CPT | Performed by: EMERGENCY MEDICINE

## 2021-11-17 PROCEDURE — 99285 EMERGENCY DEPT VISIT HI MDM: CPT

## 2021-11-17 PROCEDURE — 85025 COMPLETE CBC W/AUTO DIFF WBC: CPT | Performed by: EMERGENCY MEDICINE

## 2021-11-17 RX ORDER — PANTOPRAZOLE SODIUM 40 MG/1
40 TABLET, DELAYED RELEASE ORAL
Status: DISCONTINUED | OUTPATIENT
Start: 2021-11-17 | End: 2021-11-19 | Stop reason: HOSPADM

## 2021-11-17 RX ORDER — LORATADINE 10 MG/1
10 TABLET ORAL DAILY
Status: DISCONTINUED | OUTPATIENT
Start: 2021-11-18 | End: 2021-11-19 | Stop reason: HOSPADM

## 2021-11-17 RX ADMIN — PANTOPRAZOLE SODIUM 40 MG: 40 TABLET, DELAYED RELEASE ORAL at 18:20

## 2021-11-17 RX ADMIN — IOHEXOL 100 ML: 350 INJECTION, SOLUTION INTRAVENOUS at 12:34

## 2021-11-17 RX ADMIN — IRON SUCROSE 200 MG: 20 INJECTION, SOLUTION INTRAVENOUS at 20:23

## 2021-11-18 ENCOUNTER — APPOINTMENT (INPATIENT)
Dept: RADIOLOGY | Facility: HOSPITAL | Age: 76
DRG: 811 | End: 2021-11-18
Payer: COMMERCIAL

## 2021-11-18 LAB
BASOPHILS # BLD AUTO: 0.05 THOUSANDS/ΜL (ref 0–0.1)
BASOPHILS NFR BLD AUTO: 1 % (ref 0–1)
EOSINOPHIL # BLD AUTO: 0.16 THOUSAND/ΜL (ref 0–0.61)
EOSINOPHIL NFR BLD AUTO: 2 % (ref 0–6)
ERYTHROCYTE [DISTWIDTH] IN BLOOD BY AUTOMATED COUNT: 13.9 % (ref 11.6–15.1)
HCT VFR BLD AUTO: 25.7 % (ref 34.8–46.1)
HGB BLD-MCNC: 7.4 G/DL (ref 11.5–15.4)
HGB BLD-MCNC: 7.6 G/DL (ref 11.5–15.4)
HGB BLD-MCNC: 8 G/DL (ref 11.5–15.4)
IMM GRANULOCYTES # BLD AUTO: 0.03 THOUSAND/UL (ref 0–0.2)
IMM GRANULOCYTES NFR BLD AUTO: 0 % (ref 0–2)
LYMPHOCYTES # BLD AUTO: 2.13 THOUSANDS/ΜL (ref 0.6–4.47)
LYMPHOCYTES NFR BLD AUTO: 27 % (ref 14–44)
MCH RBC QN AUTO: 30.7 PG (ref 26.8–34.3)
MCHC RBC AUTO-ENTMCNC: 31.1 G/DL (ref 31.4–37.4)
MCV RBC AUTO: 99 FL (ref 82–98)
MONOCYTES # BLD AUTO: 0.51 THOUSAND/ΜL (ref 0.17–1.22)
MONOCYTES NFR BLD AUTO: 6 % (ref 4–12)
NEUTROPHILS # BLD AUTO: 5.05 THOUSANDS/ΜL (ref 1.85–7.62)
NEUTS SEG NFR BLD AUTO: 64 % (ref 43–75)
NRBC BLD AUTO-RTO: 0 /100 WBCS
PLATELET # BLD AUTO: 206 THOUSANDS/UL (ref 149–390)
PMV BLD AUTO: 12.5 FL (ref 8.9–12.7)
RBC # BLD AUTO: 2.61 MILLION/UL (ref 3.81–5.12)
WBC # BLD AUTO: 7.93 THOUSAND/UL (ref 4.31–10.16)

## 2021-11-18 PROCEDURE — 99222 1ST HOSP IP/OBS MODERATE 55: CPT | Performed by: INTERNAL MEDICINE

## 2021-11-18 PROCEDURE — 85025 COMPLETE CBC W/AUTO DIFF WBC: CPT | Performed by: STUDENT IN AN ORGANIZED HEALTH CARE EDUCATION/TRAINING PROGRAM

## 2021-11-18 PROCEDURE — NC001 PR NO CHARGE: Performed by: INTERNAL MEDICINE

## 2021-11-18 PROCEDURE — 74178 CT ABD&PLV WO CNTR FLWD CNTR: CPT

## 2021-11-18 PROCEDURE — G1004 CDSM NDSC: HCPCS

## 2021-11-18 PROCEDURE — 85018 HEMOGLOBIN: CPT | Performed by: STUDENT IN AN ORGANIZED HEALTH CARE EDUCATION/TRAINING PROGRAM

## 2021-11-18 RX ORDER — POTASSIUM CHLORIDE 20 MEQ/1
40 TABLET, EXTENDED RELEASE ORAL ONCE
Status: COMPLETED | OUTPATIENT
Start: 2021-11-18 | End: 2021-11-18

## 2021-11-18 RX ADMIN — IOHEXOL 100 ML: 350 INJECTION, SOLUTION INTRAVENOUS at 16:09

## 2021-11-18 RX ADMIN — PANTOPRAZOLE SODIUM 40 MG: 40 TABLET, DELAYED RELEASE ORAL at 06:24

## 2021-11-18 RX ADMIN — LORATADINE 10 MG: 10 TABLET ORAL at 09:00

## 2021-11-18 RX ADMIN — IRON SUCROSE 300 MG: 20 INJECTION, SOLUTION INTRAVENOUS at 11:06

## 2021-11-18 RX ADMIN — POTASSIUM CHLORIDE 40 MEQ: 1500 TABLET, EXTENDED RELEASE ORAL at 09:00

## 2021-11-19 VITALS
HEIGHT: 61 IN | TEMPERATURE: 97.6 F | SYSTOLIC BLOOD PRESSURE: 115 MMHG | HEART RATE: 75 BPM | BODY MASS INDEX: 23.6 KG/M2 | OXYGEN SATURATION: 97 % | RESPIRATION RATE: 18 BRPM | DIASTOLIC BLOOD PRESSURE: 64 MMHG | WEIGHT: 125 LBS

## 2021-11-19 PROBLEM — K62.5 BRIGHT RED BLOOD PER RECTUM: Status: RESOLVED | Noted: 2021-11-13 | Resolved: 2021-11-19

## 2021-11-19 LAB
ALBUMIN SERPL BCP-MCNC: 2.9 G/DL (ref 3.5–5)
ALP SERPL-CCNC: 75 U/L (ref 46–116)
ALT SERPL W P-5'-P-CCNC: 16 U/L (ref 12–78)
ANION GAP SERPL CALCULATED.3IONS-SCNC: 6 MMOL/L (ref 4–13)
AST SERPL W P-5'-P-CCNC: 13 U/L (ref 5–45)
BASOPHILS # BLD AUTO: 0.03 THOUSANDS/ΜL (ref 0–0.1)
BASOPHILS NFR BLD AUTO: 0 % (ref 0–1)
BILIRUB SERPL-MCNC: 0.17 MG/DL (ref 0.2–1)
BUN SERPL-MCNC: 21 MG/DL (ref 5–25)
CALCIUM ALBUM COR SERPL-MCNC: 9.3 MG/DL (ref 8.3–10.1)
CALCIUM SERPL-MCNC: 8.4 MG/DL (ref 8.3–10.1)
CHLORIDE SERPL-SCNC: 109 MMOL/L (ref 100–108)
CO2 SERPL-SCNC: 25 MMOL/L (ref 21–32)
CREAT SERPL-MCNC: 0.8 MG/DL (ref 0.6–1.3)
EOSINOPHIL # BLD AUTO: 0.17 THOUSAND/ΜL (ref 0–0.61)
EOSINOPHIL NFR BLD AUTO: 2 % (ref 0–6)
ERYTHROCYTE [DISTWIDTH] IN BLOOD BY AUTOMATED COUNT: 14.3 % (ref 11.6–15.1)
GFR SERPL CREATININE-BSD FRML MDRD: 72 ML/MIN/1.73SQ M
GLUCOSE SERPL-MCNC: 95 MG/DL (ref 65–140)
HCT VFR BLD AUTO: 23 % (ref 34.8–46.1)
HGB BLD-MCNC: 7.3 G/DL (ref 11.5–15.4)
HGB BLD-MCNC: 8.7 G/DL (ref 11.5–15.4)
IMM GRANULOCYTES # BLD AUTO: 0.04 THOUSAND/UL (ref 0–0.2)
IMM GRANULOCYTES NFR BLD AUTO: 1 % (ref 0–2)
LYMPHOCYTES # BLD AUTO: 1.59 THOUSANDS/ΜL (ref 0.6–4.47)
LYMPHOCYTES NFR BLD AUTO: 21 % (ref 14–44)
MCH RBC QN AUTO: 31.1 PG (ref 26.8–34.3)
MCHC RBC AUTO-ENTMCNC: 31.7 G/DL (ref 31.4–37.4)
MCV RBC AUTO: 98 FL (ref 82–98)
MONOCYTES # BLD AUTO: 0.65 THOUSAND/ΜL (ref 0.17–1.22)
MONOCYTES NFR BLD AUTO: 9 % (ref 4–12)
NEUTROPHILS # BLD AUTO: 4.98 THOUSANDS/ΜL (ref 1.85–7.62)
NEUTS SEG NFR BLD AUTO: 67 % (ref 43–75)
NRBC BLD AUTO-RTO: 0 /100 WBCS
PLATELET # BLD AUTO: 198 THOUSANDS/UL (ref 149–390)
PMV BLD AUTO: 12.6 FL (ref 8.9–12.7)
POTASSIUM SERPL-SCNC: 3.5 MMOL/L (ref 3.5–5.3)
PROT SERPL-MCNC: 6 G/DL (ref 6.4–8.2)
RBC # BLD AUTO: 2.35 MILLION/UL (ref 3.81–5.12)
SODIUM SERPL-SCNC: 140 MMOL/L (ref 136–145)
WBC # BLD AUTO: 7.46 THOUSAND/UL (ref 4.31–10.16)

## 2021-11-19 PROCEDURE — 80053 COMPREHEN METABOLIC PANEL: CPT | Performed by: STUDENT IN AN ORGANIZED HEALTH CARE EDUCATION/TRAINING PROGRAM

## 2021-11-19 PROCEDURE — 85025 COMPLETE CBC W/AUTO DIFF WBC: CPT | Performed by: STUDENT IN AN ORGANIZED HEALTH CARE EDUCATION/TRAINING PROGRAM

## 2021-11-19 PROCEDURE — 99239 HOSP IP/OBS DSCHRG MGMT >30: CPT | Performed by: INTERNAL MEDICINE

## 2021-11-19 PROCEDURE — 85018 HEMOGLOBIN: CPT | Performed by: STUDENT IN AN ORGANIZED HEALTH CARE EDUCATION/TRAINING PROGRAM

## 2021-11-19 RX ORDER — POTASSIUM CHLORIDE 20 MEQ/1
40 TABLET, EXTENDED RELEASE ORAL ONCE
Status: COMPLETED | OUTPATIENT
Start: 2021-11-19 | End: 2021-11-19

## 2021-11-19 RX ORDER — CYANOCOBALAMIN 1000 UG/ML
1000 INJECTION INTRAMUSCULAR; SUBCUTANEOUS ONCE
Status: COMPLETED | OUTPATIENT
Start: 2021-11-19 | End: 2021-11-19

## 2021-11-19 RX ORDER — FOLIC ACID 1 MG/1
1 TABLET ORAL DAILY
Qty: 30 TABLET | Refills: 0 | Status: SHIPPED | OUTPATIENT
Start: 2021-11-20 | End: 2021-12-20

## 2021-11-19 RX ORDER — FOLIC ACID 1 MG/1
1 TABLET ORAL DAILY
Status: DISCONTINUED | OUTPATIENT
Start: 2021-11-19 | End: 2021-11-19 | Stop reason: HOSPADM

## 2021-11-19 RX ADMIN — POTASSIUM CHLORIDE 40 MEQ: 1500 TABLET, EXTENDED RELEASE ORAL at 08:23

## 2021-11-19 RX ADMIN — LORATADINE 10 MG: 10 TABLET ORAL at 08:23

## 2021-11-19 RX ADMIN — FOLIC ACID 1 MG: 1 TABLET ORAL at 10:49

## 2021-11-19 RX ADMIN — PANTOPRAZOLE SODIUM 40 MG: 40 TABLET, DELAYED RELEASE ORAL at 05:49

## 2021-11-19 RX ADMIN — IRON SUCROSE 300 MG: 20 INJECTION, SOLUTION INTRAVENOUS at 08:23

## 2021-11-19 RX ADMIN — CYANOCOBALAMIN TAB 500 MCG 1000 MCG: 500 TAB at 10:49

## 2021-11-19 RX ADMIN — CYANOCOBALAMIN 1000 MCG: 1000 INJECTION, SOLUTION INTRAMUSCULAR; SUBCUTANEOUS at 13:35

## 2022-04-05 ENCOUNTER — LAB REQUISITION (OUTPATIENT)
Dept: LAB | Facility: HOSPITAL | Age: 77
End: 2022-04-05
Payer: COMMERCIAL

## 2022-04-05 DIAGNOSIS — D50.9 IRON DEFICIENCY ANEMIA, UNSPECIFIED: ICD-10-CM

## 2022-04-05 DIAGNOSIS — K63.5 POLYP OF COLON: ICD-10-CM

## 2022-04-05 DIAGNOSIS — K57.30 DIVERTICULOSIS OF LARGE INTESTINE WITHOUT PERFORATION OR ABSCESS WITHOUT BLEEDING: ICD-10-CM

## 2022-04-05 PROCEDURE — 88305 TISSUE EXAM BY PATHOLOGIST: CPT | Performed by: PATHOLOGY

## 2022-10-26 ENCOUNTER — TELEPHONE (OUTPATIENT)
Dept: OBGYN CLINIC | Facility: CLINIC | Age: 77
End: 2022-10-26

## 2022-10-26 DIAGNOSIS — Z12.31 VISIT FOR SCREENING MAMMOGRAM: Primary | ICD-10-CM

## 2023-01-19 ENCOUNTER — APPOINTMENT (OUTPATIENT)
Dept: RADIOLOGY | Age: 78
End: 2023-01-19

## 2023-01-19 DIAGNOSIS — J22 LOWER RESPIRATORY TRACT INFECTION: ICD-10-CM

## 2023-01-25 ENCOUNTER — HOSPITAL ENCOUNTER (OUTPATIENT)
Dept: RADIOLOGY | Age: 78
Discharge: HOME | End: 2023-01-25

## 2023-01-25 VITALS — BODY MASS INDEX: 23.6 KG/M2 | HEIGHT: 61 IN | WEIGHT: 125 LBS

## 2023-01-25 DIAGNOSIS — Z12.31 VISIT FOR SCREENING MAMMOGRAM: ICD-10-CM

## 2023-02-07 ENCOUNTER — ANNUAL EXAM (OUTPATIENT)
Dept: OBGYN CLINIC | Facility: CLINIC | Age: 78
End: 2023-02-07

## 2023-02-07 VITALS
DIASTOLIC BLOOD PRESSURE: 70 MMHG | SYSTOLIC BLOOD PRESSURE: 150 MMHG | BODY MASS INDEX: 24.29 KG/M2 | HEIGHT: 62 IN | WEIGHT: 132 LBS

## 2023-02-07 DIAGNOSIS — Z12.31 ENCOUNTER FOR SCREENING MAMMOGRAM FOR MALIGNANT NEOPLASM OF BREAST: ICD-10-CM

## 2023-02-07 DIAGNOSIS — Z01.419 ENCOUNTER FOR GYNECOLOGICAL EXAMINATION (GENERAL) (ROUTINE) WITHOUT ABNORMAL FINDINGS: Primary | ICD-10-CM

## 2023-02-07 RX ORDER — FLUTICASONE PROPIONATE AND SALMETEROL 250; 50 UG/1; UG/1
POWDER RESPIRATORY (INHALATION)
COMMUNITY

## 2023-02-07 RX ORDER — MULTIVIT-MIN/IRON/FOLIC ACID/K 18-600-40
CAPSULE ORAL
COMMUNITY

## 2023-02-07 RX ORDER — TACROLIMUS 1 MG/G
OINTMENT TOPICAL
COMMUNITY
Start: 2023-01-12

## 2023-02-07 NOTE — PROGRESS NOTES
Willian Schooling Topping   1945    CC:  Yearly exam    S:  68 y o  female here for yearly exam  She is postmenopausal and has had no vaginal bleeding  She denies vaginal discharge, itching, odor or dryness  She has been taking a new nutritional supplement for her hair loss which contains biotin  If that is not effective, she may try rogaine  Encouraged  Sexual activity: She is sexually active without pain, bleeding or dryness  Last Pap: 2/24/2021 - negative; previous pap negative 2014, 2017  Last Mammo: 1/25/2023 - BIRAD-1  Last Colonoscopy: 4/5/2022 - polyp; 3 years  Last DEXA: 4/9/2021 - t-score -1 6 left hip; repeat 5 years    We reviewed ASCCP guidelines for Pap testing  Current Outpatient Medications:   •  Ascorbic Acid (Vitamin C) 500 MG CAPS, Vitamin C, Disp: , Rfl:   •  betamethasone valerate (VALISONE) 0 1 % cream, betamethasone valerate 0 1 % topical cream, Disp: , Rfl:   •  calcium carbonate (OS-DIEGO) 1250 (500 Ca) MG tablet, Take 1 tablet by mouth daily, Disp: , Rfl:   •  cholecalciferol (VITAMIN D3) 400 units tablet, Take 400 Units by mouth daily, Disp: , Rfl:   •  influenza vaccine, high-dose (FLUZONE HIGH-DOSE) 0 5 ML Karen REYNOSOzone High-Dose 2015-16 (PF) 180 mcg/0 5 mL intramuscular syringe  TO BE ADMINISTERED BY PHARMACIST FOR IMMUNIZATION, Disp: , Rfl:   •  loratadine (CLARITIN) 10 mg tablet, Take 10 mg by mouth daily, Disp: , Rfl:   •  pneumococcal 13-valent conjugate vaccine (PREVNAR-13), Inject 0 5 mL into a muscle, Disp: , Rfl:   •  tacrolimus (PROTOPIC) 0 1 % ointment, APPLY TO AFFECTED AREA OF EYELIDS TWICE A DAY AS DIRECTED, Disp: , Rfl:   •  fluticasone-salmeterol (ADVAIR) 250-50 mcg/dose inhaler, Inhale 1 puff 2 (two) times a day Rinse mouth after use   (Patient not taking: Reported on 11/12/2021 ), Disp: , Rfl:   •  Fluticasone-Salmeterol (Advair) 250-50 mcg/dose inhaler, Advair Diskus 250 mcg-50 mcg/dose powder for inhalation (Patient not taking: Reported on 2/7/2023), Disp: , Rfl:   •  folic acid (FOLVITE) 1 mg tablet, Take 1 tablet (1 mg total) by mouth daily, Disp: 30 tablet, Rfl: 0  •  pantoprazole (PROTONIX) 40 mg tablet, Take 1 tablet (40 mg total) by mouth daily in the early morning (Patient not taking: Reported on 11/17/2021 ), Disp: 30 tablet, Rfl: 0  Social History     Socioeconomic History   • Marital status: /Civil Union     Spouse name: Not on file   • Number of children: Not on file   • Years of education: Not on file   • Highest education level: Not on file   Occupational History   • Not on file   Tobacco Use   • Smoking status: Never   • Smokeless tobacco: Never   Vaping Use   • Vaping Use: Never used   Substance and Sexual Activity   • Alcohol use: Yes     Comment: A shot of liqueur once or twice a week   • Drug use: No   • Sexual activity: Yes     Partners: Male     Birth control/protection: Post-menopausal   Other Topics Concern   • Not on file   Social History Narrative   • Not on file     Social Determinants of Health     Financial Resource Strain: Not on file   Food Insecurity: Not on file   Transportation Needs: Not on file   Physical Activity: Not on file   Stress: Not on file   Social Connections: Not on file   Intimate Partner Violence: Not on file   Housing Stability: Not on file     Family History   Problem Relation Age of Onset   • No Known Problems Mother    • Diabetes Father         Late onset   • Heart attack Father    • Heart disease Father         Arteriosclerosis   • Rashes / Skin problems Father         Psoriasis   • No Known Problems Sister    • No Known Problems Daughter    • Cancer Maternal Grandmother         Uterine   • Colon cancer Maternal Grandfather    • No Known Problems Paternal Grandmother    • No Known Problems Paternal Grandfather    • Breast cancer Maternal Aunt 79   • Breast cancer Paternal Aunt 61   • Breast cancer Other      Past Medical History:   Diagnosis Date   • Allergic     environmental   • Anemia 11/21    Rectal bleeding   • Asthma    • Pneumonia         Review of Systems   Respiratory: Negative  Cardiovascular: Negative  Gastrointestinal: Negative for constipation and diarrhea  Genitourinary: Negative for difficulty urinating, pelvic pain, vaginal bleeding, vaginal discharge, itching or odor  O:  Blood pressure 150/70, height 5' 1 5" (1 562 m), weight 59 9 kg (132 lb)  Patient appears well and is not in distress  Neck is supple without masses  Breasts are symmetrical without mass, tenderness, nipple discharge, skin changes or adenopathy  Abdomen is soft and nontender without masses  External genitals are normal without lesions or rashes  Urethral meatus and urethra are normal  Bladder is normal to palpation  Vagina is normal without discharge or bleeding  Cervix is normal without discharge or lesion  Uterus is normal, mobile, nontender without palpable mass  Adnexa are normal, nontender, without palpable mass  A:   Yearly exam      P:   Pap no longer indicated; patient comfortable with this  Mammo slip given   Colonoscopy due 2025   DEXA due 2026; encouraged calcium/vitamin D and exercise    RTO one year for yearly exam or sooner as needed

## 2023-03-02 ENCOUNTER — TELEPHONE (OUTPATIENT)
Dept: GASTROENTEROLOGY | Facility: CLINIC | Age: 78
End: 2023-03-02

## 2023-03-02 NOTE — TELEPHONE ENCOUNTER
Pt states she received a letter to schedule a follow up appt  Pt states she already has a gatroenterologist and does wish to schedule with us at this time

## 2024-02-26 ENCOUNTER — APPOINTMENT (OUTPATIENT)
Dept: RADIOLOGY | Facility: MEDICAL CENTER | Age: 79
End: 2024-02-26
Payer: COMMERCIAL

## 2024-02-26 ENCOUNTER — HOSPITAL ENCOUNTER (OUTPATIENT)
Dept: MAMMOGRAPHY | Facility: MEDICAL CENTER | Age: 79
Discharge: HOME | End: 2024-02-26
Payer: COMMERCIAL

## 2024-02-26 DIAGNOSIS — Z12.31 ENCOUNTER FOR SCREENING MAMMOGRAM FOR MALIGNANT NEOPLASM OF BREAST: ICD-10-CM

## 2024-02-26 PROCEDURE — 77067 SCR MAMMO BI INCL CAD: CPT

## 2024-02-26 PROCEDURE — 77063 BREAST TOMOSYNTHESIS BI: CPT

## 2024-02-29 ENCOUNTER — ANNUAL EXAM (OUTPATIENT)
Dept: OBGYN CLINIC | Facility: CLINIC | Age: 79
End: 2024-02-29
Payer: COMMERCIAL

## 2024-02-29 ENCOUNTER — TELEPHONE (OUTPATIENT)
Age: 79
End: 2024-02-29

## 2024-02-29 VITALS
HEIGHT: 61 IN | DIASTOLIC BLOOD PRESSURE: 54 MMHG | BODY MASS INDEX: 24.24 KG/M2 | WEIGHT: 128.4 LBS | SYSTOLIC BLOOD PRESSURE: 110 MMHG

## 2024-02-29 DIAGNOSIS — Z01.419 ENCOUNTER FOR GYNECOLOGICAL EXAMINATION (GENERAL) (ROUTINE) WITHOUT ABNORMAL FINDINGS: Primary | ICD-10-CM

## 2024-02-29 DIAGNOSIS — Z12.31 ENCOUNTER FOR SCREENING MAMMOGRAM FOR MALIGNANT NEOPLASM OF BREAST: ICD-10-CM

## 2024-02-29 PROCEDURE — G0101 CA SCREEN;PELVIC/BREAST EXAM: HCPCS | Performed by: OBSTETRICS & GYNECOLOGY

## 2024-02-29 NOTE — TELEPHONE ENCOUNTER
Ting called to confirm amounts of vitamin D and calcium she take daily. Advised calcium 1200mg once daily in combination with diet and supplements. Body can only absorb 600mg at a time so if taking in supplement divide dose.  Reviewed risks associated with higher doses of calcium include affects to artieries.  Patient states she is aware of this.  Vitamin D 1000 IU daily.  Patient verbalized understanding. WCB with any additional questions.

## 2024-02-29 NOTE — PROGRESS NOTES
Ting SANDOVAL Topping   1945    CC:  Yearly exam    S:  78 y.o. female here for yearly exam. She is postmenopausal and has had no vaginal bleeding.  She denies vaginal discharge, itching, odor or dryness.     Sexual activity: She is sexually active without pain, bleeding or dryness.     Last Pap: 2/24/2021 - negative; previous pap negative 2017, 2014  Last Mammo: 2/26/2024 - BIRAD-1  Last Colonoscopy: 4/5/2022 - tubular adenoma;   Last DEXA: 4/9/2021 - t-score -1.6; repeat 5 years    We reviewed ASCCP guidelines for Pap testing.       Current Outpatient Medications:     Ascorbic Acid (Vitamin C) 500 MG CAPS, Vitamin C, Disp: , Rfl:     betamethasone valerate (VALISONE) 0.1 % cream, betamethasone valerate 0.1 % topical cream, Disp: , Rfl:     calcium carbonate (OS-DIEGO) 1250 (500 Ca) MG tablet, Take 1 tablet by mouth daily, Disp: , Rfl:     cholecalciferol (VITAMIN D3) 400 units tablet, Take 400 Units by mouth daily, Disp: , Rfl:     influenza vaccine, high-dose (FLUZONE HIGH-DOSE) 0.5 ML EDGARDO Fluzone High-Dose 2015-16 (PF) 180 mcg/0.5 mL intramuscular syringe  TO BE ADMINISTERED BY PHARMACIST FOR IMMUNIZATION, Disp: , Rfl:     loratadine (CLARITIN) 10 mg tablet, Take 10 mg by mouth daily, Disp: , Rfl:     pneumococcal 13-valent conjugate vaccine (PREVNAR-13), Inject 0.5 mL into a muscle, Disp: , Rfl:     tacrolimus (PROTOPIC) 0.1 % ointment, APPLY TO AFFECTED AREA OF EYELIDS TWICE A DAY AS DIRECTED, Disp: , Rfl:     Fluticasone-Salmeterol (Advair) 250-50 mcg/dose inhaler, Advair Diskus 250 mcg-50 mcg/dose powder for inhalation (Patient not taking: Reported on 2/7/2023), Disp: , Rfl:     NON FORMULARY, Viviscal, Disp: , Rfl:   Social History     Socioeconomic History    Marital status: /Civil Union     Spouse name: Not on file    Number of children: Not on file    Years of education: Not on file    Highest education level: Not on file   Occupational History    Not on file   Tobacco Use    Smoking status: Never     Smokeless tobacco: Never   Vaping Use    Vaping status: Never Used   Substance and Sexual Activity    Alcohol use: Yes     Comment: A shot of liqueur once or twice a week    Drug use: No    Sexual activity: Yes     Partners: Male     Birth control/protection: Post-menopausal   Other Topics Concern    Not on file   Social History Narrative    Not on file     Social Determinants of Health     Financial Resource Strain: Not on file   Food Insecurity: No Food Insecurity (11/18/2021)    Hunger Vital Sign     Worried About Running Out of Food in the Last Year: Never true     Ran Out of Food in the Last Year: Never true   Transportation Needs: No Transportation Needs (11/18/2021)    PRAPARE - Transportation     Lack of Transportation (Medical): No     Lack of Transportation (Non-Medical): No   Physical Activity: Not on file   Stress: Not on file   Social Connections: Not on file   Intimate Partner Violence: Not on file   Housing Stability: Low Risk  (11/18/2021)    Housing Stability Vital Sign     Unable to Pay for Housing in the Last Year: No     Number of Places Lived in the Last Year: 1     Unstable Housing in the Last Year: No     Family History   Problem Relation Age of Onset    No Known Problems Mother     Diabetes Father         Late onset    Heart attack Father     Heart disease Father         Arteriosclerosis    Rashes / Skin problems Father         Psoriasis    No Known Problems Sister     No Known Problems Daughter     Endometrial cancer Maternal Grandmother     Cancer Maternal Grandmother         Uterine    Colon cancer Maternal Grandfather     No Known Problems Paternal Grandmother     No Known Problems Paternal Grandfather     Breast cancer Maternal Aunt 70    Breast cancer Paternal Aunt 60    Breast cancer Other      Past Medical History:   Diagnosis Date    Allergic     environmental    Anemia 11/21    Rectal bleeding    Asthma     Pneumonia         Review of Systems   Respiratory: Negative.   "  Cardiovascular: Negative.    Gastrointestinal: Negative for constipation and diarrhea.   Genitourinary: Negative for difficulty urinating, pelvic pain, vaginal bleeding, vaginal discharge, itching or odor.    O:  Blood pressure 110/54, height 5' 1\" (1.549 m), weight 58.2 kg (128 lb 6.4 oz).    Patient appears well and is not in distress  Neck is supple without masses  Breasts are symmetrical without mass, tenderness, nipple discharge, skin changes or adenopathy.   Abdomen is soft and nontender without masses.   External genitals are normal without lesions or rashes.  Urethral meatus and urethra are normal  Bladder is normal to palpation  Vagina is normal without discharge or bleeding.   Cervix is normal without discharge or lesion.   Uterus is normal, mobile, nontender without palpable mass.  Adnexa are normal, nontender, without palpable mass.     A:   Yearly exam.     P:   Pap no longer indicated  Mammo slip given   Colonoscopy per Dr Maldonado   DEXSTEPH due 2026    RTO one-two years for yearly exam or sooner as needed.     "

## 2024-11-12 ENCOUNTER — HOSPITAL ENCOUNTER (OUTPATIENT)
Dept: RADIOLOGY | Facility: HOSPITAL | Age: 79
Discharge: HOME | End: 2024-11-12
Payer: COMMERCIAL

## 2024-11-12 DIAGNOSIS — M54.2 CERVICALGIA: ICD-10-CM

## 2024-11-12 DIAGNOSIS — M25.562 LEFT KNEE PAIN, UNSPECIFIED CHRONICITY: ICD-10-CM

## 2024-11-12 DIAGNOSIS — M25.561 RIGHT KNEE PAIN, UNSPECIFIED CHRONICITY: ICD-10-CM

## 2024-11-12 PROCEDURE — 73560 X-RAY EXAM OF KNEE 1 OR 2: CPT

## 2024-11-12 PROCEDURE — 72040 X-RAY EXAM NECK SPINE 2-3 VW: CPT

## 2024-11-15 ENCOUNTER — TELEPHONE (OUTPATIENT)
Age: 79
End: 2024-11-15

## 2024-11-15 NOTE — TELEPHONE ENCOUNTER
Patient calling in asking when her next appt was with OBGYN, pt was notified that there isnt a scheduled appt within her appt desk, pt was asking if she needs to come yearly or every 2 years, pt was notified that some insurances have restrictions, pt stating that she will check with her insurance company and call back.

## 2025-04-09 ENCOUNTER — HOSPITAL ENCOUNTER (OUTPATIENT)
Dept: MAMMOGRAPHY | Facility: MEDICAL CENTER | Age: 80
Discharge: HOME | End: 2025-04-09
Payer: COMMERCIAL

## 2025-04-09 VITALS — WEIGHT: 128 LBS | HEIGHT: 61 IN | BODY MASS INDEX: 24.17 KG/M2

## 2025-04-09 DIAGNOSIS — Z12.31 ENCOUNTER FOR SCREENING MAMMOGRAM FOR MALIGNANT NEOPLASM OF BREAST: ICD-10-CM

## 2025-04-09 PROCEDURE — 77063 BREAST TOMOSYNTHESIS BI: CPT

## 2025-04-09 PROCEDURE — 77067 SCR MAMMO BI INCL CAD: CPT

## 2025-05-17 ENCOUNTER — OFFICE VISIT (OUTPATIENT)
Dept: URGENT CARE | Age: 80
End: 2025-05-17
Payer: COMMERCIAL

## 2025-05-17 VITALS
SYSTOLIC BLOOD PRESSURE: 148 MMHG | HEART RATE: 73 BPM | RESPIRATION RATE: 18 BRPM | DIASTOLIC BLOOD PRESSURE: 70 MMHG | TEMPERATURE: 97.7 F | OXYGEN SATURATION: 96 %

## 2025-05-17 DIAGNOSIS — J06.9 UPPER RESPIRATORY TRACT INFECTION, UNSPECIFIED TYPE: Primary | ICD-10-CM

## 2025-05-17 PROCEDURE — 99213 OFFICE O/P EST LOW 20 MIN: CPT

## 2025-05-17 PROCEDURE — S9088 SERVICES PROVIDED IN URGENT: HCPCS

## 2025-05-17 NOTE — PATIENT INSTRUCTIONS
Lungs clear to auscultation on exam, offered chest x-ray, patient declined at this time. Recommendations discussed with patient, agree that imaging is not indicated now, would advise follow up with no resolution of symptoms within one week, or if symptoms worsen.   May trial OTC Coricidin for cough/congestion as desired.   Follow up with PCP if no relief within one week.

## 2025-05-17 NOTE — PROGRESS NOTES
[facility]  Name: Ting Estevez      : 1945      MRN: [id number]  Encounter Provider: FAITH Hawthorne  Encounter Date: 2025   Encounter department: [facility] [place]  :  Lungs clear to auscultation on exam, offered chest x-ray, patient declined at this time. Recommendations discussed with patient, agree that imaging is not indicated now, would advise follow up with no resolution of symptoms within one week, or if symptoms worsen.   May trial OTC Coricidin for cough/congestion as desired.   Follow up with PCP if no relief within one week.   Assessment & Plan  Upper respiratory tract infection, unspecified type             Patient Instructions  Patient Education     Upper Respiratory Infection ED   General Information   You came to the Emergency Department (ED) for an upper respiratory infection or URI. A URI can affect your nose, throat, ears, and sinuses. A virus is the cause of almost all URIs and antibiotics will not help you feel better more quickly. The common cold is an example of a viral URI.  URIs are easy to spread from person to person, most often through coughing or sneezing. A URI will almost always get better in a week or two without any treatment.  What care is needed at home?   Call your regular doctor to let them know you were in the ED. Make a follow-up appointment if you were told to.  If you smoke, try to quit. Your doctor or nurse can help.  Drink lots of fluids like water, juice, or broth. This will help replace any fluids lost if you have a runny nose or fever. Warm tea or soup can help soothe a sore throat.  If the air in your home feels dry, use a cool mist humidifier. This can help a stuffy nose and make it easier to breathe.  You can also use saline nose drops or spray to relieve stuffiness.  If you decide to take over-the-counter cough or cold medicines, follow the directions on the label carefully. Be sure you do not take more than 1 medicine that  contains acetaminophen. Also, if you have a heart problem or high blood pressure, check with your doctor before you take any of these medicines.  Wash your hands often. Cough or sneeze into a tissue or your elbow instead of your hands. When around others, you might also want to wear a face mask. These steps can help keep others around you healthy.  When do I need to get emergency help?   Return to the ED if:   You have trouble breathing when talking or sitting still.  When do I need to call the doctor?   You have a fever of 100.4°F (38°C) or higher for several days, chills, a very bad sore throat, or ear or sinus pain.  You develop a new fever after several days of feeling the same or improving.  You develop chest pain when you cough.  You have a cough that lasts more than 10 days.  You cough up blood.  You have new or worsening symptoms.  Last Reviewed Date   2022-02-01  Consumer Information Use and Disclaimer   This generalized information is a limited summary of diagnosis, treatment, and/or medication information. It is not meant to be comprehensive and should be used as a tool to help the user understand and/or assess potential diagnostic and treatment options. It does NOT include all information about conditions, treatments, medications, side effects, or risks that may apply to a specific patient. It is not intended to be medical advice or a substitute for the medical advice, diagnosis, or treatment of a health care provider based on the health care provider's examination and assessment of a patient’s specific and unique circumstances. Patients must speak with a health care provider for complete information about their health, medical questions, and treatment options, including any risks or benefits regarding use of medications. This information does not endorse any treatments or medications as safe, effective, or approved for treating a specific patient. [employer] and its affiliates disclaim any warranty  or liability relating to this information or the use thereof. The use of this information is governed by the Terms of Use, available at https://www.[employer].com/en/know/clinical-effectiveness-terms   Copyright   Follow up with PCP in 3-5 days.  Proceed to  ER if symptoms worsen.    If tests are performed, our office will contact you with results only if changes need to made to the care plan discussed with you at the visit. You can review your full results on [facility].    Chief Complaint:   Chief Complaint   Patient presents with    Cough    Cold Like Symptoms     Symptoms started on Tuesday. Productive cough present. Hx pneumonia.      History of Present Illness   Cough  This is a new problem. The current episode started in the past 7 days (x 4-5 days). The problem has been unchanged. The problem occurs every few hours. The cough is Productive of sputum. Pertinent negatives include no chest pain, chills, ear congestion, ear pain, eye redness, fever, headaches, heartburn, hemoptysis, myalgias, nasal congestion, postnasal drip, rash, rhinorrhea, sore throat, shortness of breath, sweats, weight loss or wheezing. Nothing aggravates the symptoms. She has tried nothing for the symptoms. The treatment provided no relief. Her past medical history is significant for pneumonia. There is no history of asthma, bronchiectasis, bronchitis, COPD, emphysema or environmental allergies.         Review of Systems   Constitutional:  Negative for chills, fatigue, fever and weight loss.   HENT:  Positive for congestion. Negative for ear discharge, ear pain, postnasal drip, rhinorrhea, sinus pressure, sinus pain, sneezing and sore throat.    Eyes: Negative.  Negative for pain, discharge, redness and itching.   Respiratory:  Positive for cough. Negative for apnea, hemoptysis, choking, chest tightness, shortness of breath, wheezing and stridor.    Cardiovascular: Negative.  Negative for chest pain and palpitations.    Gastrointestinal: Negative.  Negative for diarrhea, heartburn, nausea and vomiting.   Endocrine: Negative.  Negative for polydipsia, polyphagia and polyuria.   Genitourinary: Negative.  Negative for decreased urine volume and flank pain.   Musculoskeletal: Negative.  Negative for arthralgias, back pain, myalgias, neck pain and neck stiffness.   Skin: Negative.  Negative for color change and rash.   Allergic/Immunologic: Negative.  Negative for environmental allergies.   Neurological: Negative.  Negative for dizziness, facial asymmetry, light-headedness, numbness and headaches.   Hematological: Negative.  Negative for adenopathy.   Psychiatric/Behavioral: Negative.       Past Medical History   Past Medical History:   Diagnosis Date    Allergic     environmental    Anemia 11/21    Rectal bleeding    Asthma     Lyme disease     Pneumonia     Trigger finger     cortisone injection     Past Surgical History:   Procedure Laterality Date    BACK SURGERY      lumbar laminectomy    BREAST BIOPSY Left 03/24/2010    CORE benign    CATARACT EXTRACTION N/A 06/22/2022    CATARACT EXTRACTION N/A 07/27/2022    COLONOSCOPY  04/05/2022    polyp    FOOT SURGERY Bilateral     bunionectomy    KNEE ARTHROSCOPY      SKIN BIOPSY      lip    TONSILLECTOMY       Family History   Problem Relation Age of Onset    No Known Problems Mother     Diabetes Father         Late onset    Heart attack Father     Heart disease Father         Arteriosclerosis    Rashes / Skin problems Father         Psoriasis    No Known Problems Sister     No Known Problems Daughter     Endometrial cancer Maternal Grandmother     Cancer Maternal Grandmother         Uterine    Colon cancer Maternal Grandfather     No Known Problems Paternal Grandmother     No Known Problems Paternal Grandfather     Breast cancer Maternal Aunt 70    Breast cancer Paternal Aunt 60    Breast cancer Other      she reports that she has never smoked. She has never used smokeless tobacco. She  reports current alcohol use. She reports that she does not use drugs.  Current Outpatient Medications   Medication Instructions    Ascorbic Acid (Vitamin C) 500 MG CAPS     betamethasone valerate (VALISONE) 0.1 % cream     calcium carbonate (OS-DIEGO) 1250 (500 Ca) MG tablet 1 tablet, Daily    cholecalciferol (VITAMIN D3) 400 Units, Daily    Fluticasone-Salmeterol (Advair) 250-50 mcg/dose inhaler Advair Diskus 250 mcg-50 mcg/dose powder for inhalation    influenza vaccine, high-dose (FLUZONE HIGH-DOSE) 0.5 ML EDGARDO Fluzone High-Dose 2015-16 (PF) 180 mcg/0.5 mL intramuscular syringe   TO BE ADMINISTERED BY PHARMACIST FOR IMMUNIZATION    loratadine (CLARITIN) 10 mg, Daily    NON FORMULARY Viviscal    pneumococcal 13-valent conjugate vaccine (PREVNAR-13) 0.5 mL    tacrolimus (PROTOPIC) 0.1 % ointment APPLY TO AFFECTED AREA OF EYELIDS TWICE A DAY AS DIRECTED   Allergies[1]     Objective   /70   Pulse 73   Temp 97.7 °F (36.5 °C)   Resp 18   SpO2 96%      Physical Exam  Vitals and nursing note reviewed.   Constitutional:       General: She is not in acute distress.     Appearance: She is well-developed. She is not ill-appearing, toxic-appearing or diaphoretic.      Interventions: She is not intubated.  HENT:      Head: Normocephalic and atraumatic.     Eyes:      Conjunctiva/sclera: Conjunctivae normal.       Cardiovascular:      Rate and Rhythm: Normal rate and regular rhythm.      Heart sounds: Normal heart sounds, S1 normal and S2 normal. Heart sounds not distant. No murmur heard.  Pulmonary:      Effort: Pulmonary effort is normal. No tachypnea, bradypnea, accessory muscle usage, prolonged expiration, respiratory distress or retractions. She is not intubated.      Breath sounds: Normal breath sounds. No stridor, decreased air movement or transmitted upper airway sounds. No decreased breath sounds, wheezing, rhonchi or rales.   Abdominal:      Palpations: Abdomen is soft.      Tenderness: There is no abdominal  "tenderness.     Musculoskeletal:         General: No swelling.      Cervical back: Neck supple.     Skin:     General: Skin is warm and dry.      Capillary Refill: Capillary refill takes less than 2 seconds.     Neurological:      Mental Status: She is alert.     Psychiatric:         Mood and Affect: Mood normal.         Portions of the record may have been created with voice recognition software.  Occasional wrong word or \"sound a like\" substitutions may have occurred due to the inherent limitations of voice recognition software.  Read the chart carefully and recognize, using context, where substitutions have occurred.         [1]   Allergies  Allergen Reactions    No Active Allergies      "